# Patient Record
Sex: FEMALE | Race: WHITE | NOT HISPANIC OR LATINO | URBAN - METROPOLITAN AREA
[De-identification: names, ages, dates, MRNs, and addresses within clinical notes are randomized per-mention and may not be internally consistent; named-entity substitution may affect disease eponyms.]

---

## 2017-01-23 ENCOUNTER — AMBULATORY - HEALTHEAST (OUTPATIENT)
Dept: NEUROSURGERY | Facility: CLINIC | Age: 67
End: 2017-01-23

## 2017-01-24 ENCOUNTER — AMBULATORY - HEALTHEAST (OUTPATIENT)
Dept: LAB | Facility: CLINIC | Age: 67
End: 2017-01-24

## 2017-01-24 ENCOUNTER — HOSPITAL ENCOUNTER (OUTPATIENT)
Dept: RADIOLOGY | Facility: CLINIC | Age: 67
Discharge: HOME | End: 2017-01-24
Attending: SURGERY

## 2017-01-24 DIAGNOSIS — Z41.9 SURGERY, ELECTIVE: ICD-10-CM

## 2017-01-26 ENCOUNTER — COMMUNICATION - HEALTHEAST (OUTPATIENT)
Dept: NEUROSURGERY | Facility: CLINIC | Age: 67
End: 2017-01-26

## 2017-01-26 ENCOUNTER — RECORDS - HEALTHEAST (OUTPATIENT)
Dept: ADMINISTRATIVE | Facility: OTHER | Age: 67
End: 2017-01-26

## 2017-01-26 ENCOUNTER — OFFICE VISIT - HEALTHEAST (OUTPATIENT)
Dept: NEUROSURGERY | Facility: CLINIC | Age: 67
End: 2017-01-26

## 2017-01-26 DIAGNOSIS — Z01.818 PRE-OP EVALUATION: ICD-10-CM

## 2017-01-31 ENCOUNTER — RECORDS - HEALTHEAST (OUTPATIENT)
Dept: ADMINISTRATIVE | Facility: OTHER | Age: 67
End: 2017-01-31

## 2017-01-31 ENCOUNTER — ANESTHESIA - HEALTHEAST (OUTPATIENT)
Dept: SURGERY | Facility: CLINIC | Age: 67
End: 2017-01-31

## 2017-02-14 ENCOUNTER — ANESTHESIA - HEALTHEAST (OUTPATIENT)
Dept: SURGERY | Facility: CLINIC | Age: 67
End: 2017-02-14

## 2017-02-15 ENCOUNTER — HOSPITAL ENCOUNTER (INPATIENT)
Dept: MEDSURG UNIT | Facility: CLINIC | Age: 67
Discharge: HOME | End: 2017-02-18
Attending: SURGERY | Admitting: SURGERY
Payer: COMMERCIAL

## 2017-02-15 ENCOUNTER — SURGERY - HEALTHEAST (OUTPATIENT)
Dept: SURGERY | Facility: CLINIC | Age: 67
End: 2017-02-15

## 2017-02-15 DIAGNOSIS — K59.03 CONSTIPATION DUE TO PAIN MEDICATION: ICD-10-CM

## 2017-02-15 DIAGNOSIS — G89.18 POSTOPERATIVE PAIN: ICD-10-CM

## 2017-02-15 DIAGNOSIS — Z00.00 PREVENTATIVE HEALTH CARE: ICD-10-CM

## 2017-02-15 RX ORDER — IBUPROFEN 200 MG
1 CAPSULE ORAL 2 TIMES DAILY
Status: SHIPPED | COMMUNITY
Start: 2017-02-15

## 2017-02-15 RX ORDER — ASCORBIC ACID 500 MG
500 TABLET ORAL DAILY
Status: SHIPPED | COMMUNITY
Start: 2017-02-15

## 2017-02-15 ASSESSMENT — MIFFLIN-ST. JEOR
SCORE: 935.64
SCORE: 935.64

## 2017-02-16 LAB
CREAT SERPL-MCNC: 0.76 MG/DL (ref 0.6–1.1)
GFR SERPL CREATININE-BSD FRML MDRD: >60 ML/MIN/1.73M2

## 2017-02-16 ASSESSMENT — MIFFLIN-ST. JEOR
SCORE: 947.26
SCORE: 947.26

## 2017-02-18 RX ORDER — ASPIRIN 81 MG/1
81 TABLET, CHEWABLE ORAL DAILY
Refills: 0 | Status: SHIPPED | COMMUNITY
Start: 2017-02-22

## 2017-02-19 ENCOUNTER — COMMUNICATION - HEALTHEAST (OUTPATIENT)
Dept: SCHEDULING | Facility: CLINIC | Age: 67
End: 2017-02-19

## 2017-02-20 ENCOUNTER — COMMUNICATION - HEALTHEAST (OUTPATIENT)
Dept: NEUROSURGERY | Facility: CLINIC | Age: 67
End: 2017-02-20

## 2017-02-27 ENCOUNTER — AMBULATORY - HEALTHEAST (OUTPATIENT)
Dept: NEUROSURGERY | Facility: CLINIC | Age: 67
End: 2017-02-27

## 2017-02-27 DIAGNOSIS — Z51.89 VISIT FOR WOUND CHECK: ICD-10-CM

## 2017-03-06 ENCOUNTER — AMBULATORY - HEALTHEAST (OUTPATIENT)
Dept: NEUROSURGERY | Facility: CLINIC | Age: 67
End: 2017-03-06

## 2017-03-06 DIAGNOSIS — Z48.02 REMOVAL OF STAPLES: ICD-10-CM

## 2017-03-29 ENCOUNTER — OFFICE VISIT - HEALTHEAST (OUTPATIENT)
Dept: NEUROSURGERY | Facility: CLINIC | Age: 67
End: 2017-03-29

## 2017-03-29 ENCOUNTER — AMBULATORY - HEALTHEAST (OUTPATIENT)
Dept: NEUROSURGERY | Facility: CLINIC | Age: 67
End: 2017-03-29

## 2017-03-29 DIAGNOSIS — M48.062 LUMBAR STENOSIS WITH NEUROGENIC CLAUDICATION: ICD-10-CM

## 2017-03-29 RX ORDER — ACETAMINOPHEN 500 MG
500 TABLET ORAL EVERY 6 HOURS PRN
Status: SHIPPED | COMMUNITY
Start: 2017-03-29

## 2017-03-29 RX ORDER — IBUPROFEN 200 MG
200 TABLET ORAL EVERY 6 HOURS PRN
Status: SHIPPED | COMMUNITY
Start: 2017-03-29

## 2017-03-29 ASSESSMENT — MIFFLIN-ST. JEOR: SCORE: 933.66

## 2017-03-30 ENCOUNTER — OFFICE VISIT - HEALTHEAST (OUTPATIENT)
Dept: PHYSICAL THERAPY | Facility: REHABILITATION | Age: 67
End: 2017-03-30

## 2017-03-30 DIAGNOSIS — R29.898 HIP WEAKNESS: ICD-10-CM

## 2017-03-30 DIAGNOSIS — M48.062 LUMBAR STENOSIS WITH NEUROGENIC CLAUDICATION: ICD-10-CM

## 2017-03-30 DIAGNOSIS — R29.898 WEAKNESS OF LEFT HIP: ICD-10-CM

## 2017-03-30 DIAGNOSIS — M53.86 DECREASED ROM OF LUMBAR SPINE: ICD-10-CM

## 2017-03-30 DIAGNOSIS — M54.16 LUMBAR RADICULOPATHY: ICD-10-CM

## 2017-04-03 ENCOUNTER — OFFICE VISIT - HEALTHEAST (OUTPATIENT)
Dept: PHYSICAL THERAPY | Facility: REHABILITATION | Age: 67
End: 2017-04-03

## 2017-04-03 DIAGNOSIS — M53.86 DECREASED ROM OF LUMBAR SPINE: ICD-10-CM

## 2017-04-03 DIAGNOSIS — M54.16 LUMBAR RADICULOPATHY: ICD-10-CM

## 2017-04-03 DIAGNOSIS — R29.898 WEAKNESS OF BOTH HIPS: ICD-10-CM

## 2017-04-03 DIAGNOSIS — M48.062 LUMBAR STENOSIS WITH NEUROGENIC CLAUDICATION: ICD-10-CM

## 2017-04-06 ENCOUNTER — OFFICE VISIT - HEALTHEAST (OUTPATIENT)
Dept: PHYSICAL THERAPY | Facility: REHABILITATION | Age: 67
End: 2017-04-06

## 2017-04-06 DIAGNOSIS — M48.062 LUMBAR STENOSIS WITH NEUROGENIC CLAUDICATION: ICD-10-CM

## 2017-04-06 DIAGNOSIS — R29.898 WEAKNESS OF LEFT HIP: ICD-10-CM

## 2017-04-06 DIAGNOSIS — M53.86 DECREASED ROM OF LUMBAR SPINE: ICD-10-CM

## 2017-04-06 DIAGNOSIS — R29.898 WEAKNESS OF BOTH HIPS: ICD-10-CM

## 2017-04-06 DIAGNOSIS — M54.16 LUMBAR RADICULOPATHY: ICD-10-CM

## 2017-04-10 ENCOUNTER — OFFICE VISIT - HEALTHEAST (OUTPATIENT)
Dept: PHYSICAL THERAPY | Facility: REHABILITATION | Age: 67
End: 2017-04-10

## 2017-04-10 DIAGNOSIS — M53.86 DECREASED ROM OF LUMBAR SPINE: ICD-10-CM

## 2017-04-10 DIAGNOSIS — M48.062 LUMBAR STENOSIS WITH NEUROGENIC CLAUDICATION: ICD-10-CM

## 2017-04-10 DIAGNOSIS — R29.898 WEAKNESS OF BOTH HIPS: ICD-10-CM

## 2017-04-10 DIAGNOSIS — R29.898 WEAKNESS OF LEFT HIP: ICD-10-CM

## 2017-04-10 DIAGNOSIS — M54.16 LUMBAR RADICULOPATHY: ICD-10-CM

## 2017-04-13 ENCOUNTER — OFFICE VISIT - HEALTHEAST (OUTPATIENT)
Dept: PHYSICAL THERAPY | Facility: REHABILITATION | Age: 67
End: 2017-04-13

## 2017-04-13 DIAGNOSIS — M48.062 LUMBAR STENOSIS WITH NEUROGENIC CLAUDICATION: ICD-10-CM

## 2017-04-13 DIAGNOSIS — M54.16 LUMBAR RADICULOPATHY: ICD-10-CM

## 2017-04-13 DIAGNOSIS — R29.898 WEAKNESS OF BOTH HIPS: ICD-10-CM

## 2017-04-13 DIAGNOSIS — M53.86 DECREASED ROM OF LUMBAR SPINE: ICD-10-CM

## 2017-04-13 DIAGNOSIS — R29.898 WEAKNESS OF LEFT HIP: ICD-10-CM

## 2017-04-20 ENCOUNTER — OFFICE VISIT - HEALTHEAST (OUTPATIENT)
Dept: PHYSICAL THERAPY | Facility: REHABILITATION | Age: 67
End: 2017-04-20

## 2017-04-20 DIAGNOSIS — M54.16 LUMBAR RADICULOPATHY: ICD-10-CM

## 2017-04-20 DIAGNOSIS — R29.898 WEAKNESS OF BOTH HIPS: ICD-10-CM

## 2017-04-20 DIAGNOSIS — M53.86 DECREASED ROM OF LUMBAR SPINE: ICD-10-CM

## 2017-04-20 DIAGNOSIS — M48.062 LUMBAR STENOSIS WITH NEUROGENIC CLAUDICATION: ICD-10-CM

## 2017-04-24 ENCOUNTER — OFFICE VISIT - HEALTHEAST (OUTPATIENT)
Dept: PHYSICAL THERAPY | Facility: REHABILITATION | Age: 67
End: 2017-04-24

## 2017-04-24 DIAGNOSIS — M54.16 LUMBAR RADICULOPATHY: ICD-10-CM

## 2017-04-24 DIAGNOSIS — R29.898 WEAKNESS OF BOTH HIPS: ICD-10-CM

## 2017-04-24 DIAGNOSIS — R29.898 WEAKNESS OF LEFT HIP: ICD-10-CM

## 2017-04-24 DIAGNOSIS — M48.062 LUMBAR STENOSIS WITH NEUROGENIC CLAUDICATION: ICD-10-CM

## 2017-04-24 DIAGNOSIS — M53.86 DECREASED ROM OF LUMBAR SPINE: ICD-10-CM

## 2017-04-27 ENCOUNTER — OFFICE VISIT - HEALTHEAST (OUTPATIENT)
Dept: PHYSICAL THERAPY | Facility: REHABILITATION | Age: 67
End: 2017-04-27

## 2017-04-27 DIAGNOSIS — M48.062 LUMBAR STENOSIS WITH NEUROGENIC CLAUDICATION: ICD-10-CM

## 2017-04-27 DIAGNOSIS — M54.16 LUMBAR RADICULOPATHY: ICD-10-CM

## 2017-04-27 DIAGNOSIS — R29.898 WEAKNESS OF BOTH HIPS: ICD-10-CM

## 2017-04-27 DIAGNOSIS — M53.86 DECREASED ROM OF LUMBAR SPINE: ICD-10-CM

## 2017-04-27 DIAGNOSIS — R29.898 WEAKNESS OF LEFT HIP: ICD-10-CM

## 2017-05-02 ENCOUNTER — OFFICE VISIT - HEALTHEAST (OUTPATIENT)
Dept: PHYSICAL THERAPY | Facility: REHABILITATION | Age: 67
End: 2017-05-02

## 2017-05-02 DIAGNOSIS — M48.062 LUMBAR STENOSIS WITH NEUROGENIC CLAUDICATION: ICD-10-CM

## 2017-05-02 DIAGNOSIS — R29.898 WEAKNESS OF BOTH HIPS: ICD-10-CM

## 2017-05-02 DIAGNOSIS — M54.16 LUMBAR RADICULOPATHY: ICD-10-CM

## 2017-05-02 DIAGNOSIS — R29.898 WEAKNESS OF LEFT HIP: ICD-10-CM

## 2017-05-02 DIAGNOSIS — M53.86 DECREASED ROM OF LUMBAR SPINE: ICD-10-CM

## 2017-05-04 ENCOUNTER — OFFICE VISIT - HEALTHEAST (OUTPATIENT)
Dept: PHYSICAL THERAPY | Facility: REHABILITATION | Age: 67
End: 2017-05-04

## 2017-05-04 DIAGNOSIS — M54.16 LUMBAR RADICULOPATHY: ICD-10-CM

## 2017-05-04 DIAGNOSIS — M48.062 LUMBAR STENOSIS WITH NEUROGENIC CLAUDICATION: ICD-10-CM

## 2017-05-04 DIAGNOSIS — M53.86 DECREASED ROM OF LUMBAR SPINE: ICD-10-CM

## 2017-05-04 DIAGNOSIS — R29.898 WEAKNESS OF BOTH HIPS: ICD-10-CM

## 2017-05-09 ENCOUNTER — OFFICE VISIT - HEALTHEAST (OUTPATIENT)
Dept: PHYSICAL THERAPY | Facility: REHABILITATION | Age: 67
End: 2017-05-09

## 2017-05-09 DIAGNOSIS — M48.062 LUMBAR STENOSIS WITH NEUROGENIC CLAUDICATION: ICD-10-CM

## 2017-05-09 DIAGNOSIS — M54.16 LUMBAR RADICULOPATHY: ICD-10-CM

## 2017-05-09 DIAGNOSIS — R29.898 WEAKNESS OF BOTH HIPS: ICD-10-CM

## 2017-05-09 DIAGNOSIS — R29.898 WEAKNESS OF LEFT HIP: ICD-10-CM

## 2017-05-09 DIAGNOSIS — M53.86 DECREASED ROM OF LUMBAR SPINE: ICD-10-CM

## 2017-05-11 ENCOUNTER — OFFICE VISIT - HEALTHEAST (OUTPATIENT)
Dept: PHYSICAL THERAPY | Facility: REHABILITATION | Age: 67
End: 2017-05-11

## 2017-05-11 DIAGNOSIS — R29.898 WEAKNESS OF BOTH HIPS: ICD-10-CM

## 2017-05-11 DIAGNOSIS — M54.16 LUMBAR RADICULOPATHY: ICD-10-CM

## 2017-05-11 DIAGNOSIS — M48.062 LUMBAR STENOSIS WITH NEUROGENIC CLAUDICATION: ICD-10-CM

## 2017-05-11 DIAGNOSIS — M53.86 DECREASED ROM OF LUMBAR SPINE: ICD-10-CM

## 2017-05-16 ENCOUNTER — OFFICE VISIT - HEALTHEAST (OUTPATIENT)
Dept: PHYSICAL THERAPY | Facility: REHABILITATION | Age: 67
End: 2017-05-16

## 2017-05-16 DIAGNOSIS — M54.16 LUMBAR RADICULOPATHY: ICD-10-CM

## 2017-05-16 DIAGNOSIS — R29.898 WEAKNESS OF BOTH HIPS: ICD-10-CM

## 2017-05-16 DIAGNOSIS — M53.86 DECREASED ROM OF LUMBAR SPINE: ICD-10-CM

## 2017-05-16 DIAGNOSIS — R29.898 WEAKNESS OF LEFT HIP: ICD-10-CM

## 2017-05-16 DIAGNOSIS — M48.062 LUMBAR STENOSIS WITH NEUROGENIC CLAUDICATION: ICD-10-CM

## 2017-05-18 ENCOUNTER — OFFICE VISIT - HEALTHEAST (OUTPATIENT)
Dept: PHYSICAL THERAPY | Facility: REHABILITATION | Age: 67
End: 2017-05-18

## 2017-05-18 DIAGNOSIS — M48.062 LUMBAR STENOSIS WITH NEUROGENIC CLAUDICATION: ICD-10-CM

## 2017-05-18 DIAGNOSIS — R29.898 WEAKNESS OF LEFT HIP: ICD-10-CM

## 2017-05-18 DIAGNOSIS — M53.86 DECREASED ROM OF LUMBAR SPINE: ICD-10-CM

## 2017-05-18 DIAGNOSIS — M54.16 LUMBAR RADICULOPATHY: ICD-10-CM

## 2017-05-18 DIAGNOSIS — R29.898 WEAKNESS OF BOTH HIPS: ICD-10-CM

## 2017-05-23 ENCOUNTER — OFFICE VISIT - HEALTHEAST (OUTPATIENT)
Dept: PHYSICAL THERAPY | Facility: REHABILITATION | Age: 67
End: 2017-05-23

## 2017-05-23 DIAGNOSIS — M48.062 LUMBAR STENOSIS WITH NEUROGENIC CLAUDICATION: ICD-10-CM

## 2017-05-23 DIAGNOSIS — R29.898 WEAKNESS OF BOTH HIPS: ICD-10-CM

## 2017-05-23 DIAGNOSIS — R29.898 WEAKNESS OF LEFT HIP: ICD-10-CM

## 2017-05-23 DIAGNOSIS — M53.86 DECREASED ROM OF LUMBAR SPINE: ICD-10-CM

## 2017-05-23 DIAGNOSIS — M54.16 LUMBAR RADICULOPATHY: ICD-10-CM

## 2017-05-25 ENCOUNTER — OFFICE VISIT - HEALTHEAST (OUTPATIENT)
Dept: PHYSICAL THERAPY | Facility: REHABILITATION | Age: 67
End: 2017-05-25

## 2017-05-25 DIAGNOSIS — M54.16 LUMBAR RADICULOPATHY: ICD-10-CM

## 2017-05-25 DIAGNOSIS — M48.062 LUMBAR STENOSIS WITH NEUROGENIC CLAUDICATION: ICD-10-CM

## 2017-05-25 DIAGNOSIS — M53.86 DECREASED ROM OF LUMBAR SPINE: ICD-10-CM

## 2017-05-25 DIAGNOSIS — R29.898 WEAKNESS OF BOTH HIPS: ICD-10-CM

## 2017-05-30 ENCOUNTER — OFFICE VISIT - HEALTHEAST (OUTPATIENT)
Dept: PHYSICAL THERAPY | Facility: REHABILITATION | Age: 67
End: 2017-05-30

## 2017-05-30 DIAGNOSIS — M48.062 LUMBAR STENOSIS WITH NEUROGENIC CLAUDICATION: ICD-10-CM

## 2017-05-30 DIAGNOSIS — R29.898 WEAKNESS OF LEFT HIP: ICD-10-CM

## 2017-05-30 DIAGNOSIS — M53.86 DECREASED ROM OF LUMBAR SPINE: ICD-10-CM

## 2017-05-30 DIAGNOSIS — R29.898 WEAKNESS OF BOTH HIPS: ICD-10-CM

## 2017-05-30 DIAGNOSIS — M54.16 LUMBAR RADICULOPATHY: ICD-10-CM

## 2017-06-01 ENCOUNTER — OFFICE VISIT - HEALTHEAST (OUTPATIENT)
Dept: PHYSICAL THERAPY | Facility: REHABILITATION | Age: 67
End: 2017-06-01

## 2017-06-01 DIAGNOSIS — R29.898 WEAKNESS OF BOTH HIPS: ICD-10-CM

## 2017-06-01 DIAGNOSIS — M54.16 LUMBAR RADICULOPATHY: ICD-10-CM

## 2017-06-01 DIAGNOSIS — R29.898 WEAKNESS OF LEFT HIP: ICD-10-CM

## 2017-06-01 DIAGNOSIS — M48.062 LUMBAR STENOSIS WITH NEUROGENIC CLAUDICATION: ICD-10-CM

## 2017-06-01 DIAGNOSIS — M53.86 DECREASED ROM OF LUMBAR SPINE: ICD-10-CM

## 2017-06-06 ENCOUNTER — RECORDS - HEALTHEAST (OUTPATIENT)
Dept: ADMINISTRATIVE | Facility: OTHER | Age: 67
End: 2017-06-06

## 2017-06-06 ENCOUNTER — OFFICE VISIT - HEALTHEAST (OUTPATIENT)
Dept: PHYSICAL THERAPY | Facility: REHABILITATION | Age: 67
End: 2017-06-06

## 2017-06-06 DIAGNOSIS — M53.86 DECREASED ROM OF LUMBAR SPINE: ICD-10-CM

## 2017-06-06 DIAGNOSIS — R29.898 WEAKNESS OF LEFT HIP: ICD-10-CM

## 2017-06-06 DIAGNOSIS — R29.898 WEAKNESS OF BOTH HIPS: ICD-10-CM

## 2017-06-06 DIAGNOSIS — M54.16 LUMBAR RADICULOPATHY: ICD-10-CM

## 2017-06-06 DIAGNOSIS — M48.062 LUMBAR STENOSIS WITH NEUROGENIC CLAUDICATION: ICD-10-CM

## 2017-06-08 ENCOUNTER — OFFICE VISIT - HEALTHEAST (OUTPATIENT)
Dept: PHYSICAL THERAPY | Facility: REHABILITATION | Age: 67
End: 2017-06-08

## 2017-06-08 DIAGNOSIS — M53.86 DECREASED ROM OF LUMBAR SPINE: ICD-10-CM

## 2017-06-08 DIAGNOSIS — R29.898 WEAKNESS OF LEFT HIP: ICD-10-CM

## 2017-06-08 DIAGNOSIS — R29.898 WEAKNESS OF BOTH HIPS: ICD-10-CM

## 2017-06-08 DIAGNOSIS — M48.062 LUMBAR STENOSIS WITH NEUROGENIC CLAUDICATION: ICD-10-CM

## 2017-06-08 DIAGNOSIS — M54.16 LUMBAR RADICULOPATHY: ICD-10-CM

## 2017-06-27 ENCOUNTER — OFFICE VISIT - HEALTHEAST (OUTPATIENT)
Dept: PHYSICAL THERAPY | Facility: REHABILITATION | Age: 67
End: 2017-06-27

## 2017-06-27 DIAGNOSIS — M48.062 LUMBAR STENOSIS WITH NEUROGENIC CLAUDICATION: ICD-10-CM

## 2017-06-27 DIAGNOSIS — M53.86 DECREASED ROM OF LUMBAR SPINE: ICD-10-CM

## 2017-06-27 DIAGNOSIS — M54.16 LUMBAR RADICULOPATHY: ICD-10-CM

## 2017-06-27 DIAGNOSIS — R29.898 WEAKNESS OF BOTH HIPS: ICD-10-CM

## 2017-06-27 DIAGNOSIS — J45.909 ASTHMA: ICD-10-CM

## 2017-06-27 DIAGNOSIS — R29.898 WEAKNESS OF LEFT HIP: ICD-10-CM

## 2017-06-29 ENCOUNTER — OFFICE VISIT - HEALTHEAST (OUTPATIENT)
Dept: PHYSICAL THERAPY | Facility: REHABILITATION | Age: 67
End: 2017-06-29

## 2017-06-29 DIAGNOSIS — R29.898 WEAKNESS OF BOTH HIPS: ICD-10-CM

## 2017-06-29 DIAGNOSIS — M48.062 LUMBAR STENOSIS WITH NEUROGENIC CLAUDICATION: ICD-10-CM

## 2017-06-29 DIAGNOSIS — M54.16 LUMBAR RADICULOPATHY: ICD-10-CM

## 2017-06-29 DIAGNOSIS — M53.86 DECREASED ROM OF LUMBAR SPINE: ICD-10-CM

## 2017-06-29 DIAGNOSIS — R29.898 WEAKNESS OF LEFT HIP: ICD-10-CM

## 2017-07-20 ENCOUNTER — OFFICE VISIT - HEALTHEAST (OUTPATIENT)
Dept: PHYSICAL THERAPY | Facility: REHABILITATION | Age: 67
End: 2017-07-20

## 2017-07-20 DIAGNOSIS — M53.86 DECREASED ROM OF LUMBAR SPINE: ICD-10-CM

## 2017-07-20 DIAGNOSIS — M48.062 LUMBAR STENOSIS WITH NEUROGENIC CLAUDICATION: ICD-10-CM

## 2017-07-20 DIAGNOSIS — R29.898 WEAKNESS OF BOTH HIPS: ICD-10-CM

## 2017-07-20 DIAGNOSIS — M54.16 LUMBAR RADICULOPATHY: ICD-10-CM

## 2017-07-27 ENCOUNTER — OFFICE VISIT - HEALTHEAST (OUTPATIENT)
Dept: PHYSICAL THERAPY | Facility: REHABILITATION | Age: 67
End: 2017-07-27

## 2017-07-27 DIAGNOSIS — M48.062 LUMBAR STENOSIS WITH NEUROGENIC CLAUDICATION: ICD-10-CM

## 2017-07-27 DIAGNOSIS — R29.898 WEAKNESS OF BOTH HIPS: ICD-10-CM

## 2017-07-27 DIAGNOSIS — M54.16 LUMBAR RADICULOPATHY: ICD-10-CM

## 2017-07-27 DIAGNOSIS — M53.86 DECREASED ROM OF LUMBAR SPINE: ICD-10-CM

## 2017-08-03 ENCOUNTER — OFFICE VISIT - HEALTHEAST (OUTPATIENT)
Dept: PHYSICAL THERAPY | Facility: REHABILITATION | Age: 67
End: 2017-08-03

## 2017-08-03 DIAGNOSIS — R29.898 WEAKNESS OF BOTH HIPS: ICD-10-CM

## 2017-08-03 DIAGNOSIS — M48.062 LUMBAR STENOSIS WITH NEUROGENIC CLAUDICATION: ICD-10-CM

## 2017-08-03 DIAGNOSIS — M53.86 DECREASED ROM OF LUMBAR SPINE: ICD-10-CM

## 2017-08-03 DIAGNOSIS — M54.16 LUMBAR RADICULOPATHY: ICD-10-CM

## 2017-08-17 ENCOUNTER — OFFICE VISIT - HEALTHEAST (OUTPATIENT)
Dept: PHYSICAL THERAPY | Facility: REHABILITATION | Age: 67
End: 2017-08-17

## 2017-08-17 DIAGNOSIS — R29.898 WEAKNESS OF BOTH HIPS: ICD-10-CM

## 2017-08-17 DIAGNOSIS — M48.062 LUMBAR STENOSIS WITH NEUROGENIC CLAUDICATION: ICD-10-CM

## 2017-08-17 DIAGNOSIS — M53.86 DECREASED ROM OF LUMBAR SPINE: ICD-10-CM

## 2017-08-17 DIAGNOSIS — M54.16 LUMBAR RADICULOPATHY: ICD-10-CM

## 2017-11-30 ENCOUNTER — RECORDS - HEALTHEAST (OUTPATIENT)
Dept: LAB | Facility: CLINIC | Age: 67
End: 2017-11-30

## 2017-11-30 LAB
CHOLEST SERPL-MCNC: 222 MG/DL
FASTING STATUS PATIENT QL REPORTED: YES
HDLC SERPL-MCNC: 60 MG/DL
LDLC SERPL CALC-MCNC: 135 MG/DL
TRIGL SERPL-MCNC: 135 MG/DL

## 2017-12-01 LAB — HCV AB SERPL QL IA: NEGATIVE

## 2018-04-23 ENCOUNTER — RECORDS - HEALTHEAST (OUTPATIENT)
Dept: LAB | Facility: CLINIC | Age: 68
End: 2018-04-23

## 2018-04-23 LAB
ALBUMIN SERPL-MCNC: 3.8 G/DL (ref 3.5–5)
ALP SERPL-CCNC: 94 U/L (ref 45–120)
ALT SERPL W P-5'-P-CCNC: 15 U/L (ref 0–45)
ANION GAP SERPL CALCULATED.3IONS-SCNC: 13 MMOL/L (ref 5–18)
AST SERPL W P-5'-P-CCNC: 19 U/L (ref 0–40)
BILIRUB SERPL-MCNC: 0.6 MG/DL (ref 0–1)
BUN SERPL-MCNC: 20 MG/DL (ref 8–22)
CALCIUM SERPL-MCNC: 9.4 MG/DL (ref 8.5–10.5)
CHLORIDE BLD-SCNC: 106 MMOL/L (ref 98–107)
CO2 SERPL-SCNC: 23 MMOL/L (ref 22–31)
CREAT SERPL-MCNC: 0.71 MG/DL (ref 0.6–1.1)
GFR SERPL CREATININE-BSD FRML MDRD: >60 ML/MIN/1.73M2
GLUCOSE BLD-MCNC: 111 MG/DL (ref 70–125)
LIPASE SERPL-CCNC: 57 U/L (ref 0–52)
POTASSIUM BLD-SCNC: 3.5 MMOL/L (ref 3.5–5)
PROT SERPL-MCNC: 6.9 G/DL (ref 6–8)
SODIUM SERPL-SCNC: 142 MMOL/L (ref 136–145)

## 2020-05-07 ENCOUNTER — RECORDS - HEALTHEAST (OUTPATIENT)
Dept: LAB | Facility: CLINIC | Age: 70
End: 2020-05-07

## 2020-05-07 LAB
ALBUMIN SERPL-MCNC: 3.9 G/DL (ref 3.5–5)
ALP SERPL-CCNC: 81 U/L (ref 45–120)
ALT SERPL W P-5'-P-CCNC: 16 U/L (ref 0–45)
ANION GAP SERPL CALCULATED.3IONS-SCNC: 8 MMOL/L (ref 5–18)
AST SERPL W P-5'-P-CCNC: 24 U/L (ref 0–40)
BILIRUB SERPL-MCNC: 0.5 MG/DL (ref 0–1)
BUN SERPL-MCNC: 19 MG/DL (ref 8–22)
CALCIUM SERPL-MCNC: 9.4 MG/DL (ref 8.5–10.5)
CHLORIDE BLD-SCNC: 102 MMOL/L (ref 98–107)
CO2 SERPL-SCNC: 28 MMOL/L (ref 22–31)
CREAT SERPL-MCNC: 0.76 MG/DL (ref 0.6–1.1)
GFR SERPL CREATININE-BSD FRML MDRD: >60 ML/MIN/1.73M2
GLUCOSE BLD-MCNC: 81 MG/DL (ref 70–125)
POTASSIUM BLD-SCNC: 3.9 MMOL/L (ref 3.5–5)
PROT SERPL-MCNC: 6.7 G/DL (ref 6–8)
SODIUM SERPL-SCNC: 138 MMOL/L (ref 136–145)

## 2021-04-26 ENCOUNTER — RECORDS - HEALTHEAST (OUTPATIENT)
Dept: LAB | Facility: CLINIC | Age: 71
End: 2021-04-26

## 2021-04-26 LAB
ALBUMIN SERPL-MCNC: 3.8 G/DL (ref 3.5–5)
ALP SERPL-CCNC: 82 U/L (ref 45–120)
ALT SERPL W P-5'-P-CCNC: 10 U/L (ref 0–45)
ANION GAP SERPL CALCULATED.3IONS-SCNC: 12 MMOL/L (ref 5–18)
AST SERPL W P-5'-P-CCNC: 17 U/L (ref 0–40)
BILIRUB SERPL-MCNC: 0.2 MG/DL (ref 0–1)
BUN SERPL-MCNC: 23 MG/DL (ref 8–28)
CALCIUM SERPL-MCNC: 9.1 MG/DL (ref 8.5–10.5)
CHLORIDE BLD-SCNC: 106 MMOL/L (ref 98–107)
CO2 SERPL-SCNC: 25 MMOL/L (ref 22–31)
CREAT SERPL-MCNC: 0.71 MG/DL (ref 0.6–1.1)
GFR SERPL CREATININE-BSD FRML MDRD: >60 ML/MIN/1.73M2
GLUCOSE BLD-MCNC: 95 MG/DL (ref 70–125)
POTASSIUM BLD-SCNC: 4.1 MMOL/L (ref 3.5–5)
PROT SERPL-MCNC: 6.4 G/DL (ref 6–8)
SODIUM SERPL-SCNC: 143 MMOL/L (ref 136–145)
TSH SERPL DL<=0.005 MIU/L-ACNC: 1.82 UIU/ML (ref 0.3–5)

## 2021-04-27 LAB — 25(OH)D3 SERPL-MCNC: 55.8 NG/ML (ref 30–80)

## 2021-04-29 ENCOUNTER — AMBULATORY - HEALTHEAST (OUTPATIENT)
Dept: PHARMACY | Facility: CLINIC | Age: 71
End: 2021-04-29

## 2021-04-29 DIAGNOSIS — M81.0 SENILE OSTEOPOROSIS: ICD-10-CM

## 2021-04-30 ENCOUNTER — COMMUNICATION - HEALTHEAST (OUTPATIENT)
Dept: ADMINISTRATIVE | Facility: HOSPITAL | Age: 71
End: 2021-04-30

## 2021-05-12 ENCOUNTER — AMBULATORY - HEALTHEAST (OUTPATIENT)
Dept: PHARMACY | Facility: HOSPITAL | Age: 71
End: 2021-05-12

## 2021-05-26 ENCOUNTER — RECORDS - HEALTHEAST (OUTPATIENT)
Dept: ADMINISTRATIVE | Facility: CLINIC | Age: 71
End: 2021-05-26

## 2021-05-29 ENCOUNTER — RECORDS - HEALTHEAST (OUTPATIENT)
Dept: ADMINISTRATIVE | Facility: CLINIC | Age: 71
End: 2021-05-29

## 2021-05-30 ENCOUNTER — RECORDS - HEALTHEAST (OUTPATIENT)
Dept: ADMINISTRATIVE | Facility: CLINIC | Age: 71
End: 2021-05-30

## 2021-05-30 VITALS — BODY MASS INDEX: 19.83 KG/M2 | WEIGHT: 105 LBS | HEIGHT: 61 IN

## 2021-05-30 VITALS
BODY MASS INDEX: 20.39 KG/M2 | HEIGHT: 61 IN | WEIGHT: 108 LBS | WEIGHT: 108 LBS | HEIGHT: 61 IN | BODY MASS INDEX: 20.39 KG/M2

## 2021-05-30 VITALS — HEIGHT: 61 IN | BODY MASS INDEX: 20.18 KG/M2

## 2021-05-30 VITALS — BODY MASS INDEX: 19.78 KG/M2 | BODY MASS INDEX: 20.51 KG/M2 | HEIGHT: 61 IN | WEIGHT: 106.8 LBS

## 2021-06-02 ENCOUNTER — RECORDS - HEALTHEAST (OUTPATIENT)
Dept: ADMINISTRATIVE | Facility: CLINIC | Age: 71
End: 2021-06-02

## 2021-06-08 NOTE — DISCHARGE SUMMARY
HOSPITAL DISCHARGE SUMMARY         Patient Name: Oma Gross  YOB: 1950  Age: 66 y.o.  Medical Record Number: [id number]  Primary Physician: Juan Frazier MD  Admission Date: 2/15/2017.  Discharge Date: February 18, 2017       She will be discharged from [facility] to home    PRINCIPAL DIAGNOSIS CAUSING ADMISSION: Stenosis, spinal, lumbar    Discharge Diagnoses:  Principal Problem:    Stenosis, spinal, lumbar  Active Problems:    Cauda equina compression      Other CO-MORBID Hospital conditions:    BRIEF HOSPITAL COURSE: Oma Gross is a 66 y.o. female who was admitted on day of surgery and underwent DECOMPRESSIVE LUMBAR LAMINECTOMY L2-3-4-5 BILATERAL START ON LEFT SIDE on 2/15/2017 by Dr. Inderjit Pollard.  Surgery was without complications.  Postoperatively she reported worsening lower extremity weakness, MRI was done and negative. Weakness improved.   On exam on day of discharge, her strength was full with no new focal deficits.  PT/OT consultations were obtained to assess function, assist with mobilization, and evaluate for discharge recommendations.  By POD # 3 she was tolerating a regular diet, ambulating, voiding without difficulty, and obtaining good pain control on oral medication. She had a bowel movement in the hospital. Her incision was clean, dry and intact.   She met all discharge criteria and was stable for discharge.      PROCEDURES PERFORMED DURING HOSPITALIZATION: DECOMPRESSIVE LUMBAR LAMINECTOMY L2-3-4-5 BILATERAL START ON LEFT SIDE     COMPLICATIONS IN HOSPITAL:  None.    IMPORTANT PENDING TEST RESULTS: None.    RECENT LABS:   Lab Results   Component Value Date     01/24/2017    K 4.1 01/24/2017     01/24/2017    CO2 28 01/24/2017    BUN 21 01/24/2017    CREATININE 0.76 02/16/2017    CALCIUM 9.3 01/24/2017     Lab Results   Component Value Date    WBC 6.3 01/24/2017    HGB 12.6 01/24/2017    HCT 37.5 01/24/2017    MCV 90 01/24/2017      01/24/2017       PERTINENT FINDINGS/RESULTS AT DISCHARGE: lumbar spinal stenosis with compression of the cauda equina    CONDITION AT DISCHARGE:  improving    DISCHARGE MEDICATIONS     Medication List      START taking these medications          acetaminophen-codeine 300-30 mg per tablet   Quantity:  50 tablet   Dose:  1-2 tablet   Commonly known as:  TYLENOL #3   1-2 tablets, Oral, Q4H PRN       cyclobenzaprine 5 MG tablet   Quantity:  30 tablet   Dose:  5 mg   Commonly known as:  FLEXERIL   5 mg, Oral, TID PRN       polyethylene glycol 17 gram packet   Quantity:  14 each   Dose:  17 g   Commonly known as:  MIRALAX   17 g, Oral, BID       senna-docusate 8.6-50 mg tablet   Dose:  1 tablet   Commonly known as:  PERICOLACE   1 tablet, Oral, BID         CHANGE how you take these medications          aspirin 81 mg chewable tablet   Dose:  81 mg   Start taking on:  2/22/2017   81 mg, Oral, DAILY, May resume 2/22/2017   What changed:  additional instructions         CONTINUE taking these medications          ascorbic acid (vitamin C) 500 MG tablet   Dose:  500 mg   Commonly known as:  VITAMIN C   500 mg, Oral, DAILY       calcium citrate 200 mg (950 mg) tablet   Dose:  1 tablet   Commonly known as:  CALCITRATE   1 tablet, Oral, BID       docusate sodium 100 MG capsule   Dose:  200 mg   Commonly known as:  COLACE   200 mg, Oral, Daily PRN       multivitamin therapeutic tablet   Dose:  1 tablet   Commonly known as:  THERAGRAN   1 tablet, Oral, DAILY       ranitidine 300 MG tablet   Dose:  300 mg   Commonly known as:  ZANTAC   300 mg, Oral, QHS       ROLAIDS EXTRA STRENGTH ORAL   Dose:  1 tablet   1 tablet, Oral, QID PRN         STOP taking these medications          acetaminophen 500 MG tablet   Commonly known as:  TYLENOL               AFTER DISCHARGE ORDERS AND INSTRUCTIONS:    Follow up with Neurosurgery in 1  weeks for wound check, 6 weeks for postop visit.  Follow up appointment with Primary Care Physician: Juan WELDON  MD Karin in  as needed/ as directed.    Diet: as prior to surgery  Activity: see below  Restrictions: No lifting > 10 pounds. No bending, twisting, or over head lifting.   Wound care: Keep the wound clean and dry.  Cover with a waterproof dressing for showers.  Staples out with surgeon's nurse in 21 days after surgery.      Chey Unger [facility]  [address] [place] [address], [address]  [place] [address]    O: [phone]  P: [phone]

## 2021-06-08 NOTE — ANESTHESIA CARE TRANSFER NOTE
Last vitals:   Vitals:    02/15/17 1257   BP: 107/50   Pulse: 68   Resp: 12   Temp: 36.8  C (98.3  F)   SpO2: 100%     Patient's level of consciousness is drowsy  Spontaneous respirations: yes  Maintains airway independently: yes  Dentition unchanged: yes  Oropharynx: oropharynx clear of all foreign objects    QCDR Measures:  ASA# 20 - Surgical Safety Checklist: ASA20A - Safety Checks Done  PQRS# 430 - Adult PONV Prevention: 4558F - Pt received => 2 anti-emetic agents (different classes) preop & intraop  ASA# 8 - Peds PONV Prevention: NA - Not pediatric patient, not GA or 2 or more risk factors NOT present  PQRS# 424 - Arianna-op Temp Management: 4559F - At least one body temp DOCUMENTED => 35.5C or 95.9F within required timeframe  PQRS# 426 - PACU Transfer Protocol: - Transfer of care checklist used  ASA# 14 - Acute Post-op Pain: ASA14B - Patient did NOT experience pain >= 7 out of 10    I completed my SBAR handoff to the receiving nurse per policy and procedure.

## 2021-06-08 NOTE — OP NOTE
Operative Note    Name:  Oma Gross  Location: [facility] Main OR  Procedure Date:  2/15/2017  PCP:  Juan Frazier MD      DECOMPRESSIVE LUMBAR LAMINECTOMY L2-3-4-5 BILATERAL START ON LEFT SIDE (Bilateral)    Pre-Procedure Diagnosis:  Stenosis, spinal, lumbar [M48.06]     Post-Procedure Diagnosis:    * No post-op diagnosis entered *    Surgeon(s):  Inderjit Pollard MD    No Physician Assistant or First Assist has been documented in procedure    Anesthesia Type:  General    Findings:      Operative Report:    Prior to surgery I discussed with the patient the nature of the problem, the nature of the proposed surgery, the rationale for doing it, the goals, benefits, alternatives, and significant risks and complications.  I answered all her questions.  She said she was satisfied with the explanation and understood.  She requested surgery.  She gave informed consent to go ahead with surgery.  The operation was performed under general endotracheal anesthesia.  The patient had usual preop precautions like preoperative antibiotics, Gramajo catheter, pneumoboots, and so forth.   She was log rolled from supine to prone.  Her body lay on a padded frame.  Pressure points were padded.  The operative site in the lumbar region was shaved, prepped, and draped in the usual manner.  Spinal needles were placed, and a lateral x-ray was taken for localization.  The pedicles of L 2 and 3 and 4 and 5 were marked on the drape.  A vertical midline incision was made centered on those structures.  The incision was carried down through subcutaneous fat down to the spinous processes.  Paravertebral muscle was taken down off the spinous processes and laminae bilaterally with Valleylab cautery and a Murphy elevator.  Angled cerebellar retractors were used to maintain exposure.  An operating microscope was used for micro-dissection using microsurgical techniques.  A metal marker was placed at the caudal margin of the L2 pedicle and an  x-ray was taken for localization.  The x-ray was discussed with a radiologist.  We both agreed on L2.  The spinous process of L2 was removed.  The stump was waxed.  A midline laminectomy was done at L 2 proceeding from caudal to cephalad.  Bone was thinned with a Midas AM8 drill and removed with pneumatic Kerrisons.  At the cephalad end the laminectomy defect was carried from medial to lateral by thinning the bone with the Midas drill and removing the thin shell of remaining bone with pneumatic Kerrisons.  This took us out to the pedicle of L 2 on the left.  A similar procedure was done at L 3 to expose the pedicle of L 3 on the left.  The pars interarticularis and inferior articular process of L 2 were cleaned off with the TappIn cautery on a low setting.  A channel was then drilled from the medial aspect of the pedicle of L 2 to the medial aspect of the pedicle of L 3 preserving a good healthy strut of pars interarticularis and inferior articular process.  Bone medial to the channel was removed by thinning it with the Midas drill and removing it with Kerrison rongeurs.  A foraminotomy was then done over the L 2 nerve root by working from the pedicle of L 2 on down and from the pedicle of L 3 on up with a combination of Link rongeurs and angled curettes of increasing size.  In this way a good foraminotomy was done using an undercutting technique that preserved the pars interarticularis and inferior articular process and therefore the facet joint.  Similar laminectomies and foraminotomies were done at L 3-4 and L4-5 on the left.  The microscope and drill and cautery were then moved to the opposite side of the table and similar laminectomies and foraminotomies were done at L 2-3 and L3-4 and L4-5 on the right.  Hemostasis was obtained with a combination of bone wax and bipolar cautery on a low setting and epidural Gelfoam soaked in thrombin.  The wound was copiously irrigated.  A 10 mm flat Cornel-Freire drain was  left in the epidural space and brought out through a separate stab incision and secured with 2-0 silk.  The retractors were removed.  The muscle fascia was closed with 0 Vicryl.  Subcutaneous fat was closed with 0 Vicryl.  Skin was closed with staples.  A sterile dressing was applied.  The patient was log rolled from prone back to supine.  As far as I can tell she tolerated the procedure well.  As far as I can tell no complications were encountered.    Estimated Blood Loss:   225 mL from 2/15/2017  7:36 AM to 2/15/2017 12:54 PM    Specimens:    * No specimens in log *       Drains:   Closed/Suction Drain Posterior;Right Back Bulb 10 Fr. (Active)   Site Description Unable to view 2/15/2017  2:22 PM   Dressing Status  Clean;Dry;Intact 2/15/2017  2:22 PM   Drainage Appearance Bloody 2/15/2017  2:22 PM   Status To bulb suction 2/15/2017  2:22 PM   Output (mL) 40 mL 2/15/2017  2:22 PM       Urethral Catheter Non-latex 16 Fr. (Active)   Site Skin Assessment Clean;Intact 2/15/2017  1:30 PM   Care/Interventions Patent and draining;Tubing is not taut/pulling on catheter;Drainage bag kept below bladder;No tubing or bag contact with floor;Urinary drainage bag is kept < half full;Closed drainage system maintained 2/15/2017  1:30 PM   Securement Method Stabilization device 2/15/2017  1:30 PM   Protocol 1: Patient excluded from protocol? Yes, Specific provider order to keep catheter in place 2/15/2017  1:30 PM   Output (mL) 125 mL 2/15/2017  2:22 PM   Drainage Color Gaby 2/15/2017  2:22 PM       Implants:  * No implants in log *    Complications:    None    Inderjit Pollard     Date: 2/15/2017  Time: 4:45 PM

## 2021-06-08 NOTE — PROGRESS NOTES
Preop Assessment: Oma Gross presents for pre-op review.  Surgeon: Dr. Inderjit Pollard  Name of Surgery: bilateral lumbar decompressive laminectomy L2-3-4  Diagnosis: lumbar stenosis  Date of Surgery: 1/31/2017  Time of Surgery: 0730  Hospital: [facility]  H&P: by Dr. Frazier on 1/23/2017 - cleared for surgery  History of ASA, NSAIDS, vitamin and/or herbal supplements within 10 days: Yes - stopped ASA  History of blood thinners: No  History of anti-seizure med's: No  Review of systems: unchanged    Diagnostics:  Labs: WNL  CXR: clear chest  EKG: stable, ECHO on 11/3/2016)  Other:   Films: MRI from 4/13/2016 at [facility] - in PACS    All questions answered regarding surgery and expected pre and postoperative course including rehabilitation phase.     Reviewed with patient: Arrive 2.5 -3 hours prior to scheduled surgery, nothing to eat or drink after midnight the night before surgery and bring all pertinent films to the hospital the day of surgery.  Continue to refrain from NSAIDS (Ibuprofen, Aleve, Naprosyn) ASA or over the counter herbal medication or supplements, anticoagulants and blood thinners.    Preop skin preparations and instructions provided.      Olivia Beth RN, CNRN

## 2021-06-08 NOTE — PROGRESS NOTES
Pharmacy Note - Brief Admission Medication History Note  Pharmacist completed medication history with the patient while in the [facility].  Prior to admission (PTA) med list completed and updated in the electronic medical record (EMR).    The patient was asked about OTC/herbal products specifically and the PTA med list reflects the patient's response.    Allergies were reviewed and assessed with the patient, responses were updated in the EMR.    Thank you for the opportunity to participate in the care of this patient.    Grover Sharp, PharmD     2/15/2017     6:16 AM    PTA Med List   Medication Sig Last Dose     acetaminophen (TYLENOL) 500 MG tablet Take 1,000 mg by mouth 2 (two) times a day.  2/15/2017 at am     ascorbic acid, vitamin C, (VITAMIN C) 500 MG tablet Take 500 mg by mouth daily. 2/14/2017     aspirin 81 mg chewable tablet Chew 81 mg daily. 1/20/2017     CALCIUM CARB/MAGNESIUM HYDROX (ROLAIDS EXTRA STRENGTH ORAL) Take 1 tablet by mouth 4 (four) times a day as needed (heartburn).  2/14/2017     calcium citrate (CALCITRATE) 200 mg (950 mg) tablet Take 1 tablet by mouth 2 (two) times a day. 2/14/2017     docusate sodium (COLACE) 100 MG capsule Take 200 mg by mouth daily as needed for constipation. Past Week     multivitamin therapeutic (THERAGRAN) tablet Take 1 tablet by mouth daily. 2/14/2017     ranitidine (ZANTAC) 300 MG tablet Take 300 mg by mouth bedtime. 2/14/2017

## 2021-06-08 NOTE — ANESTHESIA PREPROCEDURE EVALUATION
Anesthesia Evaluation      Patient summary reviewed   No history of anesthetic complications     Airway   Mallampati: I  Neck ROM: limited   Pulmonary - normal exam   (+) asthma  mild,  well controlled,                          Cardiovascular - negative ROS and normal exam  Exercise tolerance: > or = 4 METS   Neuro/Psych    (+) CVA (TIA) , chronic pain    Endo/Other    (+) arthritis,      GI/Hepatic/Renal    (+) GERD poorly controlled,             Dental - normal exam   (+) bridge                       Anesthesia Plan  Planned anesthetic: general endotracheal  50 mg ketamine and 10 mg methadone IV on induction.  ASA 2   Induction: intravenous   Anesthetic plan and risks discussed with: patient  Anesthesia plan special considerations: rapid sequence induction, antiemetics,   Post-op plan: routine recovery

## 2021-06-08 NOTE — BRIEF OP NOTE
Brief Operative Note    Name:  Oma Gross  Location: [facility] Main OR  Procedure Date:  2/15/2017  PCP:  Juan Frazier MD      DECOMPRESSIVE LUMBAR LAMINECTOMY L2-3-4-5 BILATERAL START ON LEFT SIDE (Bilateral) plus scope    Pre-Procedure Diagnosis:    Stenosis, spinal, lumbar [M48.06]     Post-Procedure Diagnosis:    * No post-op diagnosis entered *    Surgeon(s):  Inderjit Pollard MD    Findings:    Lumbar spinal stenosis, cauda equina compression, probe at caudal margin of L2 pedicle    Estimated Blood Loss:   225 mL from 2/15/2017  7:36 AM to 2/15/2017 12:40 PM    Specimens:    * No specimens in log *       Drains:   Closed/Suction Drain Posterior;Right Back Bulb 10 Fr. (Active)       Urethral Catheter Non-latex 16 Fr. (Active)       Implants:  * No implants in log *    Complications:    None    Inderjit Pollard     Date: 2/15/2017  Time: 12:40 PM

## 2021-06-08 NOTE — PROGRESS NOTES
NEUROSURGERY POST-OP NOTE:    ASSESSMENT:     Oma Gross is POD # 1 status post  DECOMPRESSIVE LUMBAR LAMINECTOMY L2-3-4-5 BILATERAL START ON LEFT SIDE  by Dr. Pollard. Today she reports nausea, stomach ache and bilateral thighs pain (more when sitting).  Reports bilateral lower extremity pain and weakness are worse than prior to surgery. Unable to walk without significant assistance because of weakness, knee buckling, and pain (more pain in her bilateral thighs). Pain better after tylenol #3 and lying down, but still weak to walk (unable to stand straight). Reports no numbness and tingling in her bilateral lower extremity. Voided 250ml and bladder scanned for 192ml. Has Hx of chronic constipation.           PLAN:   Patient Active Problem List   Diagnosis     Stenosis, spinal, lumbar     Cauda equina compression  - Neuro exam Q4hrs and as needed if neuro exam changes.  - Advance diet as tolerated.  - Continue with current oral pain medications, Tylenol #3   - Ordered MRI to rule out postop cord compression--result showed no new acute cord compression, see detail result below.             Bowel and Bladder Plan:     Post Void Residuals: Yes; 192ml      Bowel Management:  Prophylaxis yes   Last BM: 02/13/17   Medications Plan:     Anticoagulation/Antiplatelets: will hold her Aspirin 81mg for five days postop.      DVT Prophylaxis: intermittent pneumatic compression boots     GI Prophylaxis: yes     Other:        Other Plans:   Drain: (1)Cornel-Freire drain(s) with closed bulb suction in the lower back in place with bloody drainage 50 ml of output last shift.       Incision: Incision was approximated with staple and covered with dry and 4 x 4 gauze The incision is clean, dry and no drainage     Activity Plan:  Up to chair for all her meals. Ambulate in the hallway X3 beyond her therapy and encourage IS use Q1-2 hrs 10-20 times while awake            Discharge Recommendations/Plan:  Tomorrow   Barriers to  "Discharge:  Epidural drain required to mitigate neurological risk of epidural hematoma causing cord/canal compression.  Requires ongoing nursing monitoring  for assessment of profound bleeding, cerebrospinal fluid leak or drain malfunction with resulting risk of epidural infection and abscess.   This will necessitate ongoing hospitalization until epidural drain removed.      Follow Up Plan: in one week for wound check, in three weeks for staples removal, and in six weeks for postop evaluation          HPI: Oma Gross presented to neurosurgery clinic with back pain, and bilateral leg pain, worse on the right than left. Reports right calf was feeling like constricting and right foot tingling. She was unable to walk steady and was holding things because of weakness and unsteady balance. She was presented opposite of typical pseudo-claudication as her leg symptoms get worse when she sits and worse when she stops walking. walking helps with her leg symptoms. Typical of pseudo-claudication she gets worse when she tries to extend her back. She gets better when she flexes forward. MRI showed Lumbar spinal stenosis L2-3 and L3-4 and lateral recess stenosis at L4-5       SUBJECTIVE:  She is sitting on chair and eating her breakfast and stated \"I am having pain in my both thighs and became worse since i am sitting\"       OBJECTIVE:  Vital signs in last 24 hours  Temp:  [98  F (36.7  C)-98.5  F (36.9  C)] 98  F (36.7  C)  Heart Rate:  [59-98] 62  Resp:  [8-23] 20  BP: ()/(46-64) 100/47  Body mass index is 20.41 kg/(m^2).    Mental Status: alert and oriented, person, place and timespeech normal tone   Cranial Nerves: CN1: grossly intact per patient recall. CN2: No funduscopic exam performed. CN3,4 & 6: Pupillary light response, lateral and vertical gaze normal. No nystagmus. Visual fields are full to confrontation. CN5: Intact to touch CN7: No facial weakness, smile, facial symmetry intact. CN8: Intact to spoken " voice. CN9&10: Gag reflex, uvula midline, palate rises with phonation. CN11: Shoulder shrug 5/5 intact bilaterally. CN12: Tongue midline and moves freely from side to side.    Motor Strength:normal 5/5 strength in all tested muscle groups    Hip flexors 5/5 right, 5/5 left   Quadriceps: 4/5 right, 4/5 left   Ankle dorsiflexion: 4/5 right, 4/5 left   Extensor hallicus longus: 4/5 right, 4/5 left   Ankle plantar flexion : 4/5 right, 4/5 left     Bilateral knee and ankle reflexes are 2+    Pertinent Labs   Lab Results:   Lab Results   Component Value Date     01/24/2017    K 4.1 01/24/2017     01/24/2017    CO2 28 01/24/2017    BUN 21 01/24/2017    CREATININE 0.76 01/24/2017    CALCIUM 9.3 01/24/2017     [facility]  MR LUMBAR SPINE W WO CONTRAST  2/16/2017 2:14 PM      INDICATION: New BLE pain and weakness. Previous lumbar decompression.  TECHNIQUE: Routine.  CONTRAST: 9 ml Magnevist  COMPARISON: MRI lumbar spine 04/13/2016     FINDINGS: Transitional lumbosacral anatomy with partial sacralization of L5. Nomenclature is consistent with that used previously. Unchanged spinal alignment including mild levocurvature centered at L4, 2 mm anterolisthesis at L2-L3, 3 mm anterolisthesis at L4-L5, and minimal anterolisthesis at L5-S1. Unchanged vertebral body heights. Mild Modic type I endplate edema and some reactive enhancement at L3-L4 and L4-L5 similar to the preoperative exam. Changes of interval decompressive laminectomy from L2 through L5. No pars defect. The conus tip is identified at mid L1. No pathologic intradural enhancement. Expected postoperative changes within posterior paraspinal soft tissues. A surgical drain is seen in the laminectomy bed. No abdominal aortic aneurysm. The visualized portions of the bony pelvis are normal for age.     T12-L1: Preserved disc height and signal. No focal disc herniation. No facet arthropathy. No spinal canal stenosis. No neural foraminal stenosis.       L1-L2: Minimal disc desiccation preserved disc height. Mild circumferential disc bulge. Mild facet arthropathy. No spinal canal stenosis. No neural foraminal stenosis.     L2-L3: Mild to moderate loss of disc height and signal. Circumferential disc bulge with small central disc protrusion. Minor interbody spurring. Moderate facet arthropathy. Interval dorsal decompression with improved patency of the spinal canal. No spinal canal stenosis. Mild right and mild to moderate left neural foraminal stenosis.     L3-L4: Advanced loss of disc height and signal. Mild circumferential disc osteophyte complex of asymmetric left foraminal and lateral component. Moderate facet arthropathy. Dorsal decompression. Improved patency of the spinal canal. No spinal canal stenosis. Mild to moderate right and moderate left neural foraminal stenosis. neural foraminal stenosis.     L4-L5: Moderate to advanced loss of disc height and signal. Circumferential disc bulge with superimposed shallow central disc protrusion. Mild interbody spurring. Advanced right and moderate left facet arthropathy. Dorsal decompression. No spinal canal stenosis. Moderate to severe right and mild left neural foraminal stenosis.     L5-S1: Transitional level with presumed somewhat hypoplastic disc space. No focal disc herniation. No facet arthropathy. No spinal canal stenosis. No neural foraminal stenosis.     IMPRESSION:   CONCLUSION:  1. Changes of recent dorsal decompression from L2 through L5 with improved patency of the spinal canal across this segment. Expected postoperative changes within the posterior paraspinal soft tissues.  2. At L4-L5, moderate to severe right and mild left neural foraminal stenosis is slightly increased compared to the preoperative exam.  3. At L3-L4, mild to moderate right and moderate left neural foraminal stenosis.  4. At L2-L3, mild right and mild to moderate left neural foraminal stenosis.      ADDITIONAL COMMENTS:The  patient's clinical examination, laboratory data, and plan was discussed with Dr. Atul Dill, Replaced by [facility]  [address], [address]  [place] [phone]

## 2021-06-08 NOTE — ANESTHESIA POSTPROCEDURE EVALUATION
Patient: Oma Gross  DECOMPRESSIVE LUMBAR LAMINECTOMY L2-3-4-5 BILATERAL START ON LEFT SIDE  Anesthesia type: general    Patient location: PACU  Last vitals:   Vitals:    02/15/17 1519   BP: 95/54   Pulse: 72   Resp: 16   Temp: 36.8  C (98.3  F)   SpO2: 99%     Post vital signs: stable  Level of consciousness: awake and responds to simple questions  Post-anesthesia pain: pain controlled  Post-anesthesia nausea and vomiting: no  Pulmonary: unassisted, nasal cannula  Cardiovascular: stable and blood pressure at baseline  Hydration: adequate  Anesthetic events: no    QCDR Measures:  ASA# 11 - Arianna-op Cardiac Arrest: ASA11B - Patient did NOT experience unanticipated cardiac arrest  ASA# 12 - Arianna-op Mortality Rate: ASA12B - Patient did NOT die  ASA# 13 - PACU Re-Intubation Rate: ASA13B - Patient did NOT require a new airway mgmt  ASA# 10 - Composite Anes Safety: ASA10A - No serious adverse event  ASA# 38 - New Corneal Injury: ASA38A - No new exposure keratitis or corneal abrasion in PACU    Additional Notes:

## 2021-06-08 NOTE — PROGRESS NOTES
Spiritual Care / Hospice Narrative Note    Referral Information:      Referral Reason: (P) Spiritual Support    Referral Source: (P) [facility]    Referral Comments:  Kelly Torres from [facility] [place] called here Tuesday afternoon to see if a  could anoint Zuleika before her surgery Wed. morning. Their  is in [place].    Spiritual Care Provided:        Sacraments Performed: (S) (P) Anointing ([facility] (?))         Visit Length: (P) 10-20 minutes      Describe additional spiritual care offered in visit in response to concerns and resources (Utilize phrases or bullet points, describe 's response, e.g., listen, validate, explore, educate, pray, etc.).  I worked with Kelly on Tuesday to find an available , and Kelly said that Zuleika's  ended up driving her that evening to a  to get anointed at [place] (?).     Summarize Plan of Care: (Timing of next visit, follow-up or handing over, subject(s) to be addressed, other staff or community caregiver communication, etc.)   available for spiritual care or emotional support as needed.     Eboni Restrepo,

## 2021-06-08 NOTE — PROGRESS NOTES
NEUROSURGERY POST-OP NOTE:    Patient seen and examined. I agree with this note from Jose Angel Andrade CNP.    ASSESSMENT:     Oma Gross is POD # 2 status post  DECOMPRESSIVE LUMBAR LAMINECTOMY L2-3-4-5 BILATERAL START ON LEFT SIDE  by Dr. Pollard. Today, she reports the leg pain has improved. Leg weakness has improved, she is ambulating with the walker. Complaining of mild back, hip pain. Complaining of bilateral thigh aching. Overall, improved since yesterday. MRI showed post-op changes.     PLAN:   Patient Active Problem List   Diagnosis     Stenosis, spinal, lumbar     Cauda equina compression  - Neuro exam Q4hrs and as needed if neuro exam changes.  - Pain control with Tylenol #3, ice as needed.   - Drain out when output <30cc.         Bowel and Bladder Plan:     Post Void Residuals: Yes; 34cc, 58cc, 192cc.      Bowel Management:  Prophylaxis yes   Last BM: 02/13/17. Increase bowel medications and offer suppository again. She refused earlier.    Medications Plan:     Anticoagulation/Antiplatelets: will hold her Aspirin 81mg for five days postop.      DVT Prophylaxis: intermittent pneumatic compression boots     GI Prophylaxis: yes        Drain: (1)Cornel-Freire drain(s) with closed bulb suction in the in epidural space with bloody drainage 30 ml of output last shift.       Incision: Incision was approximated with staples and covered with  primapore and 4 x 4 gauze The incision is clean, dry and no drainage.     Activity Plan:  Up to chair for all her meals. Ambulate in the hallway X3 beyond her therapy and encourage IS use Q1-2 hrs 10-20 times while awake            Discharge Recommendations/Plan:  Later today or tomorrow pending drain output.  Barriers to Discharge: Epidural drain required to mitigate neurological risk of epidural hematoma causing cord/canal compression.  Requires ongoing nursing monitoring  for assessment of profound bleeding, cerebrospinal fluid leak or drain malfunction with resulting risk  of epidural infection and abscess.   This will necessitate ongoing hospitalization until epidural drain removed.     Follow Up Plan: in one week for wound check, in three weeks for staples removal, and in six weeks for postop evaluation          HPI: Oma Gross presented to neurosurgery clinic with back pain, and bilateral leg pain, worse on the right than left. Reports right calf felt like it was constricting and right foot tingling. She was unable to walk steadily and was holding things because of weakness and unsteady balance. She was presented opposite of typical pseudo-claudication as her leg symptoms get worse when she sits and when she stops walking. walking helps with her leg symptoms. Typical of pseudo-claudication she gets worse when she tries to extend her back. She gets better when she flexes forward. MRI showed Lumbar spinal stenosis L2-3 and L3-4 and lateral recess stenosis at L4-5       SUBJECTIVE:  She is sitting up, pain is much improved since yesterday, denies weakness.    OBJECTIVE:  Vital signs in last 24 hours  Temp:  [98  F (36.7  C)-98.8  F (37.1  C)] 98.5  F (36.9  C)  Heart Rate:  [65-79] 79  Resp:  [19-20] 20  BP: ()/(43-56) 112/56  Body mass index is 20.41 kg/(m^2).    Mental Status: alert and oriented, person, place and time. speech normal tone   Cranial Nerves: PERRL, EOMI, face symmetric.   Motor Strength:  Hip flexors 5/5 right, 5/5 left   Quadriceps: 5/5 right, 5/5 left   Ankle dorsiflexion: 5/5 right, 5/5 left   Extensor hallicus longus: 5/5 right, 5/5 left   Ankle plantar flexion : 5/5 right, 5/5 left     Pertinent Labs   Lab Results:   Lab Results   Component Value Date     01/24/2017    K 4.1 01/24/2017     01/24/2017    CO2 28 01/24/2017    BUN 21 01/24/2017    CREATININE 0.76 02/16/2017    CALCIUM 9.3 01/24/2017     [facility]  MR LUMBAR SPINE W WO CONTRAST  2/16/2017 2:14 PM      INDICATION: New BLE pain and weakness. Previous lumbar  decompression.  TECHNIQUE: Routine.  CONTRAST: 9 ml Magnevist  COMPARISON: MRI lumbar spine 04/13/2016     FINDINGS: Transitional lumbosacral anatomy with partial sacralization of L5. Nomenclature is consistent with that used previously. Unchanged spinal alignment including mild levocurvature centered at L4, 2 mm anterolisthesis at L2-L3, 3 mm anterolisthesis at L4-L5, and minimal anterolisthesis at L5-S1. Unchanged vertebral body heights. Mild Modic type I endplate edema and some reactive enhancement at L3-L4 and L4-L5 similar to the preoperative exam. Changes of interval decompressive laminectomy from L2 through L5. No pars defect. The conus tip is identified at mid L1. No pathologic intradural enhancement. Expected postoperative changes within posterior paraspinal soft tissues. A surgical drain is seen in the laminectomy bed. No abdominal aortic aneurysm. The visualized portions of the bony pelvis are normal for age.     T12-L1: Preserved disc height and signal. No focal disc herniation. No facet arthropathy. No spinal canal stenosis. No neural foraminal stenosis.      L1-L2: Minimal disc desiccation preserved disc height. Mild circumferential disc bulge. Mild facet arthropathy. No spinal canal stenosis. No neural foraminal stenosis.     L2-L3: Mild to moderate loss of disc height and signal. Circumferential disc bulge with small central disc protrusion. Minor interbody spurring. Moderate facet arthropathy. Interval dorsal decompression with improved patency of the spinal canal. No spinal canal stenosis. Mild right and mild to moderate left neural foraminal stenosis.     L3-L4: Advanced loss of disc height and signal. Mild circumferential disc osteophyte complex of asymmetric left foraminal and lateral component. Moderate facet arthropathy. Dorsal decompression. Improved patency of the spinal canal. No spinal canal stenosis. Mild to moderate right and moderate left neural foraminal stenosis. neural foraminal  stenosis.     L4-L5: Moderate to advanced loss of disc height and signal. Circumferential disc bulge with superimposed shallow central disc protrusion. Mild interbody spurring. Advanced right and moderate left facet arthropathy. Dorsal decompression. No spinal canal stenosis. Moderate to severe right and mild left neural foraminal stenosis.     L5-S1: Transitional level with presumed somewhat hypoplastic disc space. No focal disc herniation. No facet arthropathy. No spinal canal stenosis. No neural foraminal stenosis.     IMPRESSION:   CONCLUSION:  1. Changes of recent dorsal decompression from L2 through L5 with improved patency of the spinal canal across this segment. Expected postoperative changes within the posterior paraspinal soft tissues.  2. At L4-L5, moderate to severe right and mild left neural foraminal stenosis is slightly increased compared to the preoperative exam.  3. At L3-L4, mild to moderate right and moderate left neural foraminal stenosis.  4. At L2-L3, mild right and mild to moderate left neural foraminal stenosis.      ADDITIONAL COMMENTS:The patient's clinical examination, laboratory data, and plan was discussed with Dr. Atul Andrade, [facility]  [phone]

## 2021-06-08 NOTE — PROGRESS NOTES
NEUROSURGERY POST-OP NOTE:    ASSESSMENT:     Oma Gross is POD # 1 status post  DECOMPRESSIVE LUMBAR LAMINECTOMY L2-3-4-5 BILATERAL START ON LEFT SIDE  by Dr. Inderjit Pollard       No new or worsening symptoms-exam at baseline.  Residual bilateral thigh pain.   Cauda equina compression noted, which increases risk of issues w/ bowel and bladder   Reports feeling like she getting a migraine( describes typical migraines headache associated w nausea and blacking out) usually comes to ER for management.   Symptoms are not suggestive of low pressure headache.   Leave drain another day. Rox lombardo'd      PLAN:   Patient Active Problem List   Diagnosis     Stenosis, spinal, lumbar  H/O migraine-continue to track for worsening symptoms     Cauda equina compression    Post Void Residuals: In progress--Follow closely causa compression increases risk of residual     Bowel Management:  Prophylaxis yes   Last BM:Before discharge    DVT Prophylaxis: intermittent pneumatic compression boots    Drain: ETHEL in place with bloody drainage 90 ml of output last shift    Activity Plan: Therapies/Out for all meals/Ambulate at least TID beyond therapies       Discharge Recommendations/Plan:  Barriers to Discharge: yes  Epidural drain required to mitigate neurological risk of epidural hematoma causing cord/canal compression.  Requires ongoing nursing monitoring  for assessment of profound bleeding, cerebrospinal fluid leak or drain malfunction with resulting risk of epidural infection and abscess.   This will necessitate ongoing hospitalization until epidural drain removed.         Follow Up Plan:  Anticipate drain out tomorrow and home w family          HPI: Oma Gross presented with symptoms of neurogenic claudication w associated N&T, she had no documented weakness. On imaging she had multi-level lumbar stenosis     SUBJECTIVE:  I fell like I'm getting a migraine       OBJECTIVE:  Vital signs in last 24 hours  Temp:  [98  F (36.7   C)-98.5  F (36.9  C)] 98  F (36.7  C)  Heart Rate:  [59-98] 62  Resp:  [8-23] 20  BP: ()/(46-64) 100/47  Body mass index is 20.41 kg/(m^2).    Mental Status: alert, person, place and timespeech normal  Cranial Nerves: II to XII  unremarkable  Motor Strength:normal 5/5 strength in all tested muscle groups  Incision: Incision was approximated with staples and covered with 4 x 4 gauze The incision is clean and dry       Pertinent Labs   Lab Results:   No new     Pertinent Radiology   Radiology Results: .             ADDITIONAL COMMENTS:The patient's clinical examination, laboratory data, and plan was discussed with None

## 2021-06-09 NOTE — PROGRESS NOTES
Oma Gross is status post bilateral decompressive laminectomy L2-L5 on 2/15/2017 by Dr. Inderjit Pollard.  Last seen in clinic on 2/27/2017 for a wound check.  Today she returns in follow up for staples removal. She is doing reasonably well - her back pain is minimal, associated with activities. Denies radicular pain. Uses a walker due to slight residual weakness in her right leg. Denies sensory disturbance. Gait and balance are stable. Takes ES Tylenol prn.      Surgical wound WNL - CDI, no signs of infection or skin breakdown.  Incision well-healed: good skin approximation, no redness or visible/palpable edema, no tenderness to palpation.  PT. AF, denies fever, chills or sweats.  Pt. reports that the symptoms are improved from pre-op.    Staples - intact removed without difficulty. Wound prepped with Betadine before and after removal.  Surrounding skin has no signs of breakdown.  Verbal instructions regarding incision care are given.  Pt. advised to call us if any s/s of infection noted - all discussed in details.

## 2021-06-09 NOTE — PROGRESS NOTES
CHART NOTE     DATE OF SERVICE:  3/29/2017     : 1950     ASSESSMENT :   Doing well with improvement in symptoms and function.      PLAN:  Loosen restrictions. Refer to PT. RTC prn. Enc to call with any new questions or concerns.     HPI:  Oma Gross is status post bilateral decompressive laminectomy L2-L5 on 2/15/2017 by Dr. Inderjit Pollard.   Preoperatively presented with bilateral low back and legs pain and symptoms of neurogenic claudication.     TODAY, Oma Gross reports leg pain is about 60% improved. Walking much improved. Prolonged standing provokes back and leg pain.  Bladder/bowel without issue other than occasional constipation. Using walker.       PAST MEDICAL HISTORY, SURGICAL HISTORY, REVIEW OF SYMPTOMS, MEDICATIONS AND ALLERGIES:  Past medical history, surgical history, review of symptoms, medications and allergies remain unchanged.    Past Medical History:   Diagnosis Date     Asthma      Chronic neck pain      GERD (gastroesophageal reflux disease)      History of transfusion      IBS (irritable bowel syndrome)      Low back pain        PHYSICAL EXAM:      Neurological exam reveals:  Recent and remote memory intact, fund of knowledge wnl.    Alert and oriented x3, speech fluent and appropriate.   PERRL, EOMI, No nystagmus, Face symmetric, tongue midline, Shoulder shrug equal  Leg strength bilateral dorsiflexion, plantar flexion, and hip flexion 5/5  Can heel/toe walk with contact guard assist.  Toes stay up nicely, good rise up on toes.   Muscle Bulk and tone wnl.   Reflexes: No pathological reflexes   Gait and station:Antalgic but steady  Incision: CDI without erythema or edema  NDI/ALAN: 47% due to restrictions.      RADIOGRAPHIC IMAGING: NA

## 2021-06-09 NOTE — PROGRESS NOTES
Oma Gross is status post bilateral decompressive laminectomy L2-L5 on 2/15/2017 by Dr. Inderjit Pollard.  Preoperatively presented with bilateral low back and legs pain and symptoms of neurogenic claudication.  Today she is here for a wound check. She is doing reasonably well - her back feels stiff. She denies sensory or motor issues in LE, uses a walker for safety. Takes T#3 prn for bilateral buttocks ache. Gait and balance are stable.      Surgical wound WNL - CDI, no signs of infection or skin breakdown.  Incision well-healed: good skin approximation, no redness or visible/palpable edema, no tenderness to palpation.  PT. AF, denies fever, chills or sweats.  Pt. reports that the symptoms are improved from pre-op.

## 2021-06-12 NOTE — PROGRESS NOTES
[facility] Discharge Summary  Patient Name: Oma Gross  Date: 11/22/2017  Referral Diagnosis:    Referring provider: Shahana Gomez CNP  Visit Diagnosis:   1. Lumbar stenosis with neurogenic claudication     2. Lumbar radiculopathy     3. Decreased ROM of lumbar spine     4. Weakness of both hips         Goals:  No Data Recorded  No Data Recorded    Patient was seen for 24 visits physical therapy.    The patient She was referred back to her neurologist and did not return for further therapy.  Goals were not fully achieved. Explanation for goals not achieved: The patient discontinued therapy, did not return.    Therapy will be discontinued at this time.  Please see progress note dated 8/17/17 for patient status.      Thank you for your referral.  Víctor Mallory, PT  11/22/2017  10:30 AM         [facility]   [facility] Follow up    Patient Name: Oma Gross  Date of evaluation: 8/17/2017  Referral Diagnosis: neurogenic claudication and decompresion laminectomy   Referring provider: Shahana Gomez CNP  Visits: 24  Visit Diagnosis:     ICD-10-CM    1. Lumbar stenosis with neurogenic claudication M48.06    2. Lumbar radiculopathy M54.16    3. Decreased ROM of lumbar spine M25.60    4. Weakness of both hips M62.81        Assessment:       Progress is up and down.   She continues to have low back pain and bilateral LE symptoms.  She feels like her legs are heavy.  She sometimes doesn't clear her foot.  She has neck pain and a history of cervical issues.    I think it is possible that the current symptoms are being driven from the neck.  Recommend f/u with neurologist.      The POC is dynamic and will be modified on an ongoing basis.  Prognosis to achieve goals is  good   Pt. is appropriate for skilled PT intervention as outlined in the Plan of Care (POC).  Pt. is a good candidate for skilled PT services to improve pain levels and function.    Goals:  Pt. will  demonstrate/verbalize independence in self-management of condition in : 6 weeks Goal met  Pt. will have improved quality of sleep: waking less times/night;in 6 weeks Progressing  Pt. will be able to walk : 30 minutes;on even surfaces;with less pain;in 6 weeks Goal met  Patient will stand : 30 minutes;with less pain;for home chores;in 6 weeks Progressing  Patient will transfer: sit/stand;for in/out of chair;with less pain;in 6 weeks Goal Met  No Data Recorded       Plan / Patient Instructions:      Return to consult with Dr. Rose about lack of resolution of symptoms.     Subjective:     Low back pain 5-6/10  Patient very frustrated with progress, or lack of.    Functional limitations are described as occurring with.:    - walking >10 min   - standing >5 min   - in and out of a chair   - bending   - amb over uneven surfaces   - lifting   - dressing   - squatting   - housework       Objective:      Burning pain in low back with bridging on mat with head flat on table.  Symptoms increased with hook lying and patient began to get nauseous.    Putting patient's head on a pillow relieved the burning and nausea.          Treatment Today     TREATMENT MINUTES COMMENTS   Evaluation     Self-care/ Home management     Manual therapy     Neuromuscular Re-education     Therapeutic Activity     Therapeutic Exercises 30 See exercise flow-sheet for details.    Gait training     Modality__________________                Total 30    Blank areas are intentional and mean the treatment did not include these items.          Víctor Mallory  8/17/2017

## 2021-06-12 NOTE — PROGRESS NOTES
[facility]   [facility]-P[facility] Follow up    Patient Name: Oma Gross  Date of evaluation: 8/3/2017  Referral Diagnosis: neurogenic claudication and decompresion laminectomy   Referring provider: Shahana Gomez CNP  Visits: 23  Visit Diagnosis:     ICD-10-CM    1. Lumbar stenosis with neurogenic claudication M48.06    2. Weakness of both hips M62.81    3. Lumbar radiculopathy M54.16    4. Decreased ROM of lumbar spine M25.60        Assessment:       Patient's functional mobility and gait continue to improve despite patient's feeling like she is not making progress.  She tolerated new exercises very well.    Pt presents S/p L2-5 decompressive laminectomies and neurogenic claudication. Pt demonstrates significant difference in L to R hip strength with L demonstrating increased weakness compared to R in tandem stance. Pt would benefit from sklled physical therapy in order to address pt's deficits to decrease pain and radicular symptoms for walking and standing for extended durations without pain.   The POC is dynamic and will be modified on an ongoing basis.  Prognosis to achieve goals is  good   Pt. is appropriate for skilled PT intervention as outlined in the Plan of Care (POC).  Pt. is a good candidate for skilled PT services to improve pain levels and function.    Goals:  Pt. will demonstrate/verbalize independence in self-management of condition in : 6 weeks Goal met  Pt. will have improved quality of sleep: waking less times/night;in 6 weeks Progressing  Pt. will be able to walk : 30 minutes;on even surfaces;with less pain;in 6 weeks Goal met  Patient will stand : 30 minutes;with less pain;for home chores;in 6 weeks Progressing  Patient will transfer: sit/stand;for in/out of chair;with less pain;in 6 weeks Goal Met  No Data Recorded       Plan / Patient Instructions:      Continue with swiss ball routine.  Then follow up in 2-3 weeks.     Subjective:     Low back pain 5-6/10  Patient is again  frustrated with progress or lack of progress.    Functional limitations are described as occurring with.:    - walking >10 min   - standing >5 min   - in and out of a chair   - bending   - amb over uneven surfaces   - lifting   - dressing   - squatting   - housework       Objective:      Limited quads and hip flexors bilaterally.      Treatment Today     TREATMENT MINUTES COMMENTS   Evaluation     Self-care/ Home management     Manual therapy     Neuromuscular Re-education     Therapeutic Activity     Therapeutic Exercises 30 See exercise flow-sheet for details.    Gait training     Modality__________________                Total 30    Blank areas are intentional and mean the treatment did not include these items.          Víctor Mallory  8/3/2017

## 2021-06-12 NOTE — PROGRESS NOTES
[facility]   [facility]-Pelvic Follow up    Patient Name: Oma Gross  Date of evaluation: 7/27/2017  Referral Diagnosis: neurogenic claudication and decompresion laminectomy   Referring provider: Shahana Gomez CNP  Visits: 22  Visit Diagnosis:     ICD-10-CM    1. Lumbar stenosis with neurogenic claudication M48.06    2. Weakness of both hips M62.81    3. Lumbar radiculopathy M54.16    4. Decreased ROM of lumbar spine M25.60        Assessment:       Patient is making good progress now.  She is having much less pain and much better functional mobility and gait.  She tolerated new exercises very well.    Pt presents S/p L2-5 decompressive laminectomies and neurogenic claudication. Pt demonstrates significant difference in L to R hip strength with L demonstrating increased weakness compared to R in tandem stance. Pt would benefit from sklled physical therapy in order to address pt's deficits to decrease pain and radicular symptoms for walking and standing for extended durations without pain.   The POC is dynamic and will be modified on an ongoing basis.  Prognosis to achieve goals is  good   Pt. is appropriate for skilled PT intervention as outlined in the Plan of Care (POC).  Pt. is a good candidate for skilled PT services to improve pain levels and function.    Goals:  Pt. will demonstrate/verbalize independence in self-management of condition in : 6 weeks Goal met  Pt. will have improved quality of sleep: waking less times/night;in 6 weeks Progressing  Pt. will be able to walk : 30 minutes;on even surfaces;with less pain;in 6 weeks Goal met  Patient will stand : 30 minutes;with less pain;for home chores;in 6 weeks Progressing  Patient will transfer: sit/stand;for in/out of chair;with less pain;in 6 weeks Goal Met  No Data Recorded       Plan / Patient Instructions:      Continue with swiss ball routine.  Then follow up in 2-3 weeks.     Subjective:     Feeling better. No pain today until she put on  shoes and sat in her car.    Functional limitations are described as occurring with.:    - walking >10 min   - standing >5 min   - in and out of a chair   - bending   - amb over uneven surfaces   - lifting   - dressing   - squatting   - housework       Objective:      Limited quads and hip flexors bilaterally.      Treatment Today     TREATMENT MINUTES COMMENTS   Evaluation     Self-care/ Home management     Manual therapy     Neuromuscular Re-education     Therapeutic Activity     Therapeutic Exercises 30 See exercise flow-sheet for details.    Gait training     Modality__________________                Total 30    Blank areas are intentional and mean the treatment did not include these items.          Víctor Mallory  7/27/2017

## 2021-06-15 PROBLEM — M48.061 STENOSIS, SPINAL, LUMBAR: Status: ACTIVE | Noted: 2017-02-15

## 2021-06-15 PROBLEM — K59.00 CONSTIPATION: Status: ACTIVE | Noted: 2017-02-19

## 2021-06-16 PROBLEM — M81.0 SENILE OSTEOPOROSIS: Status: ACTIVE | Noted: 2021-04-29

## 2021-06-22 DIAGNOSIS — M81.0 SENILE OSTEOPOROSIS: Primary | ICD-10-CM

## 2021-06-22 RX ORDER — DIPHENHYDRAMINE HYDROCHLORIDE 50 MG/ML
50 INJECTION INTRAMUSCULAR; INTRAVENOUS
Status: CANCELLED
Start: 2021-07-12

## 2021-06-22 RX ORDER — NALOXONE HYDROCHLORIDE 0.4 MG/ML
0.2 INJECTION, SOLUTION INTRAMUSCULAR; INTRAVENOUS; SUBCUTANEOUS
Status: CANCELLED | OUTPATIENT
Start: 2021-07-12

## 2021-06-22 RX ORDER — HEPARIN SODIUM,PORCINE 10 UNIT/ML
5 VIAL (ML) INTRAVENOUS
Status: CANCELLED | OUTPATIENT
Start: 2021-07-12

## 2021-06-22 RX ORDER — MEPERIDINE HYDROCHLORIDE 25 MG/ML
25 INJECTION INTRAMUSCULAR; INTRAVENOUS; SUBCUTANEOUS EVERY 30 MIN PRN
Status: CANCELLED | OUTPATIENT
Start: 2021-07-12

## 2021-06-22 RX ORDER — HEPARIN SODIUM (PORCINE) LOCK FLUSH IV SOLN 100 UNIT/ML 100 UNIT/ML
5 SOLUTION INTRAVENOUS
Status: CANCELLED | OUTPATIENT
Start: 2021-07-12

## 2021-06-22 RX ORDER — METHYLPREDNISOLONE SODIUM SUCCINATE 125 MG/2ML
125 INJECTION, POWDER, LYOPHILIZED, FOR SOLUTION INTRAMUSCULAR; INTRAVENOUS
Status: CANCELLED
Start: 2021-07-12

## 2021-06-22 RX ORDER — EPINEPHRINE 1 MG/ML
0.3 INJECTION, SOLUTION, CONCENTRATE INTRAVENOUS EVERY 5 MIN PRN
Status: CANCELLED | OUTPATIENT
Start: 2021-07-12

## 2021-06-22 RX ORDER — ALBUTEROL SULFATE 90 UG/1
1-2 AEROSOL, METERED RESPIRATORY (INHALATION)
Status: CANCELLED
Start: 2021-07-12

## 2021-06-22 RX ORDER — ZOLEDRONIC ACID 5 MG/100ML
5 INJECTION, SOLUTION INTRAVENOUS ONCE
Status: CANCELLED
Start: 2021-07-12 | End: 2021-07-12

## 2021-06-22 RX ORDER — ALBUTEROL SULFATE 0.83 MG/ML
2.5 SOLUTION RESPIRATORY (INHALATION)
Status: CANCELLED | OUTPATIENT
Start: 2021-07-12

## 2021-06-25 NOTE — PROGRESS NOTES
Progress Notes by Hermila Delaney PT at 5/2/2017 12:30 PM     Author: Hermila Delaney PT Service: -- Author Type: Physical Therapist    Filed: 5/2/2017  1:32 PM Encounter Date: 5/2/2017 Status: Signed    : Hermila Delaney PT (Physical Therapist)       [facility]   [facility]-Pelvic Follow up    Patient Name: Oma Gross  Date of evaluation: 5/2/2017  Referral Diagnosis: neurogenic claudication and decompresion laminectomy   Referring provider: Shahana Gomez CNP  Visits: 8/12  Visit Diagnosis:     ICD-10-CM    1. Lumbar stenosis with neurogenic claudication M48.06    2. Lumbar radiculopathy M54.16    3. Weakness of both hips M62.81    4. Decreased ROM of lumbar spine M25.60    5. Weakness of left hip M62.81        Assessment:    Pt presents S/p L2-5 decompressive laminectomies and neurogenic claudication. Pt is making steady progress in gluteal and abdominal strengthening as noted in ability to transition from sitting to standing without the use of her hands this day from standard height chair. R side of abdominals is realtively weaker than L due to myofacial restrictions in mid back. Pt would benefit from sklled physical therapy in order to address pt's deficits to decrease pain and radicular symptoms for walking and standing for extended durations without pain.   The POC is dynamic and will be modified on an ongoing basis.  Prognosis to achieve goals is  good   Pt. is appropriate for skilled PT intervention as outlined in the Plan of Care (POC).  Pt. is a good candidate for skilled PT services to improve pain levels and function.    Goals:  Pt. will demonstrate/verbalize independence in self-management of condition in : 6 weeks  Pt. will have improved quality of sleep: waking less times/night;in 6 weeks  Pt. will be able to walk : 30 minutes;on even surfaces;with less pain;in 6 weeks  Patient will stand : 30 minutes;with less pain;for home chores;in 6 weeks  Patient will transfer:  sit/stand;for in/out of chair;with less pain;in 6 weeks  No Data Recorded    Patient's expectations/goals are realistic.    Barriers to Learning or Achieving Goals:  Chronicity of the problem.  Co-morbidities or other medical factors.  See below       Plan / Patient Instructions:      Plan for next visit: NuStep, follow nerve pathyways for MFR, NOT PRONE, Cervical MFR     Subjective:   I can do 5-7 STS before pain. L hand gets tingles when I elevate the L hand.     Functional limitations are described as occurring with:    - walking >10 min   - standing >5 min   - in and out of a chair   - bending   - amb over uneven surfaces   - lifting   - dressing   - squatting   - housework       Objective:        Treatment Today     TREATMENT MINUTES COMMENTS   Evaluation     Self-care/ Home management     Manual therapy     Neuromuscular Re-education      Therapeutic Activity     Therapeutic Exercises 40 Supine bridging in hooklying x 10 reps   Hip lifts with glute sets x 4 breathes  90 90 hip lift on wall x 10 reps  TA bracing with reaching overhead x 10 reps   TA bracing with reaching towards feet with maintaining scap on table.   R abdominal activation needed over L   Pt given print out of new exercises   Gait training     Modality__________________                Total 40    Blank areas are intentional and mean the treatment did not include these items.     HEP:               PT Evaluation Code: (Please list factors)  Patient History/Comorbidities: stenosis, cauda equina compression  Examination: LE, gait, ROM, strength  Clinical Presentation: unstable   Clinical Decision Making: moderate    Patient History/  Comorbidities Examination  (body structures and functions, activity limitations, and/or participation restrictions) Clinical Presentation Clinical Decision Making (Complexity)   No documented Comorbidities or personal factors 1-2 Elements Stable and/or uncomplicated Low   1-2 documented comorbidities or personal  factor 3 Elements Evolving clinical presentation with changing characteristics Moderate   3-4 documented comorbidities or personal factors 4 or more Unstable and unpredictable High                  Hermila Delaney  5/2/2017  8:54 AM

## 2021-06-25 NOTE — PROGRESS NOTES
Progress Notes by Hermila Delaney PT at 4/24/2017  9:30 AM     Author: Hermila Delaney PT Service: -- Author Type: Physical Therapist    Filed: 4/24/2017 10:22 AM Encounter Date: 4/24/2017 Status: Signed    : Hermila Delanye PT (Physical Therapist)       [facility]   [facility]-Pelvic Follow up    Patient Name: Oma Gross  Date of evaluation: 4/24/2017  Referral Diagnosis: neurogenic claudication and decompresion laminectomy   Referring provider: Shahana Gomez CNP  Visits: 7/12  Visit Diagnosis:     ICD-10-CM    1. Lumbar stenosis with neurogenic claudication M48.06    2. Lumbar radiculopathy M54.16    3. Weakness of both hips M62.81    4. Decreased ROM of lumbar spine M25.60    5. Weakness of left hip M62.81        Assessment:    Pt presents S/p L2-5 decompressive laminectomies and neurogenic claudication. Pt is making steady progress in gluteal and abdominal strengthening as noted in increased duration tolerance without pain. Hip weakness continues to be greater in R glute med and L hip extension. Stride length has increased with gluteal activation. L UE n/t suggests myofascial pattern has progressed up pt's spine and should be address from cervical orientation next session. Pt would benefit from sklled physical therapy in order to address pt's deficits to decrease pain and radicular symptoms for walking and standing for extended durations without pain.   The POC is dynamic and will be modified on an ongoing basis.  Prognosis to achieve goals is  good   Pt. is appropriate for skilled PT intervention as outlined in the Plan of Care (POC).  Pt. is a good candidate for skilled PT services to improve pain levels and function.    Goals:  Pt. will demonstrate/verbalize independence in self-management of condition in : 6 weeks  Pt. will have improved quality of sleep: waking less times/night;in 6 weeks  Pt. will be able to walk : 30 minutes;on even surfaces;with less pain;in 6  weeks  Patient will stand : 30 minutes;with less pain;for home chores;in 6 weeks  Patient will transfer: sit/stand;for in/out of chair;with less pain;in 6 weeks  No Data Recorded    Patient's expectations/goals are realistic.    Barriers to Learning or Achieving Goals:  Chronicity of the problem.  Co-morbidities or other medical factors.  See below       Plan / Patient Instructions:      Plan for next visit: NuStep, follow nerve pathyways for MFR, NOT PRONE, Cervical MFR     Subjective:   Able to hold glutes for 10 sec and abdominals for 30 seconds finally this week. I was able to be outside for extended periods and work on garden things without pain. Low L hip/back and R calf tightness right now.     Functional limitations are described as occurring with:    - walking >10 min   - standing >5 min   - in and out of a chair   - bending   - amb over uneven surfaces   - lifting   - dressing   - squatting   - housework       Objective:        Treatment Today     TREATMENT MINUTES COMMENTS   Evaluation     Self-care/ Home management     Manual therapy     Neuromuscular Re-education 25 Supine D2 LE pattern in F and Ext each Le x 5 reps x 3 sets with VC to contract abdominals for stability  Seated UE diagonals with coordinated breathing x 10 reps L UE modified to decreased lever arm due to tingling  Supine Marching with TA activation x 10 reps each side. Pt notes light tingling in her face with marching pt had pt PPT and chin tuck with no change in symptoms with either.   Pt given print out of exercises this day.    Therapeutic Activity     Therapeutic Exercises     Gait training     Modality__________________                Total 25    Blank areas are intentional and mean the treatment did not include these items.     HEP:             PT Evaluation Code: (Please list factors)  Patient History/Comorbidities: stenosis, cauda equina compression  Examination: LE, gait, ROM, strength  Clinical Presentation: unstable   Clinical  Decision Making: moderate    Patient History/  Comorbidities Examination  (body structures and functions, activity limitations, and/or participation restrictions) Clinical Presentation Clinical Decision Making (Complexity)   No documented Comorbidities or personal factors 1-2 Elements Stable and/or uncomplicated Low   1-2 documented comorbidities or personal factor 3 Elements Evolving clinical presentation with changing characteristics Moderate   3-4 documented comorbidities or personal factors 4 or more Unstable and unpredictable High                  Hermila Delaney  4/24/2017  8:54 AM

## 2021-06-25 NOTE — PROGRESS NOTES
Progress Notes by Hermila Delaney PT at 4/6/2017 10:00 AM     Author: Hermila Delaney PT Service: -- Author Type: Physical Therapist    Filed: 4/6/2017  5:59 PM Encounter Date: 4/6/2017 Status: Signed    : Hermila Delaney PT (Physical Therapist)       [facility]   [facility]-Pelvic Follow up    Patient Name: Oma Gross  Date of evaluation: 4/6/2017  Referral Diagnosis: [unfilled]  Referring provider: Shahana Gomez CNP  Visits: 3/12  Visit Diagnosis:     ICD-10-CM    1. Lumbar stenosis with neurogenic claudication M48.06    2. Lumbar radiculopathy M54.16    3. Decreased ROM of lumbar spine M25.60    4. Weakness of both hips M62.81    5. Weakness of left hip M62.81        Assessment:    Pt presents S/p L2-5 decompressive laminectomies and neurogenic claudication.  L sided hip weakness >R and limited lumbar ROM in all directions. Difficulty maintaining sidelying positioning for extended periods >20 min due to decreased spinal support and weakness in abdominals and glutes. Pt notes decreased stiffness in LEs with manual techniques this day as pt is leaving. Pt would benefit from sklled physical therapy in order to address pt's deficits to decrease pain and radicular symptoms for walking and standing for extended durations without pain.   The POC is dynamic and will be modified on an ongoing basis.  Prognosis to achieve goals is  good   Pt. is appropriate for skilled PT intervention as outlined in the Plan of Care (POC).  Pt. is a good candidate for skilled PT services to improve pain levels and function.    Goals:  Pt. will demonstrate/verbalize independence in self-management of condition in : 6 weeks  Pt. will have improved quality of sleep: waking less times/night;in 6 weeks  Pt. will be able to walk : 30 minutes;on even surfaces;with less pain;in 6 weeks  Patient will stand : 30 minutes;with less pain;for home chores;in 6 weeks  Patient will transfer: sit/stand;for in/out of  "chair;with less pain;in 6 weeks  No Data Recorded    Patient's expectations/goals are realistic.    Barriers to Learning or Achieving Goals:  Chronicity of the problem.  Co-morbidities or other medical factors.  See below       Plan / Patient Instructions:      Plan for next visit: [facility]onia, follow nerve pathyways for MFR, NOT PRONE,      Subjective:   Exercises are going well I have been achey but not as sharp of the pain. I made the mistake of driving over to a neighbors, whom i thought was having a heart attack only a block and a half away. I thought \" I can make it that far.\" But I have been so sore and \"paying for it\" all day today.     Functional limitations are described as occurring with:    - walking >10 min   - standing >5 min   - in and out of a chair   - bending   - amb over uneven surfaces   - lifting   - dressing   - squatting   - housework       Objective:        Treatment Today     TREATMENT MINUTES COMMENTS   Evaluation     Self-care/ Home management     Manual therapy 25  Sidelying MFR B glutes with the fibers and against the fibers; gentle pressure as pt is very sensitive. Pt had to switch sides half way through    Neuromuscular Re-education  Discussed towel between rib cage and hip for neutral spine with sleeping on pt's side.    Therapeutic Activity     Therapeutic Exercises 5 Seated in slight flexion alternating UEs for scapular and abdominal activation    Gait training     Modality__________________                Total 30    Blank areas are intentional and mean the treatment did not include these items.     HEP:             PT Evaluation Code: (Please list factors)  Patient History/Comorbidities: stenosis, cauda equina compression  Examination: LE, gait, ROM, strength  Clinical Presentation: unstable   Clinical Decision Making: moderate    Patient History/  Comorbidities Examination  (body structures and functions, activity limitations, and/or participation restrictions) Clinical Presentation " Clinical Decision Making (Complexity)   No documented Comorbidities or personal factors 1-2 Elements Stable and/or uncomplicated Low   1-2 documented comorbidities or personal factor 3 Elements Evolving clinical presentation with changing characteristics Moderate   3-4 documented comorbidities or personal factors 4 or more Unstable and unpredictable High                  Hermila Delaney  4/6/2017  8:54 AM

## 2021-06-25 NOTE — PROGRESS NOTES
Progress Notes by Víctor Mallory PT at 6/27/2017 11:00 AM     Author: Víctor Mallory PT Service: -- Author Type: Physical Therapist    Filed: 6/27/2017  2:57 PM Encounter Date: 6/27/2017 Status: Signed    : Víctor Mallory PT (Physical Therapist)       [facility]   [facility]-Pelvic Follow up    Patient Name: Oma Gross  Date of evaluation: 6/27/2017  Referral Diagnosis: neurogenic claudication and decompresion laminectomy   Referring provider: Shahana Gomez CNP  Visits: 19  Visit Diagnosis:     ICD-10-CM    1. Lumbar stenosis with neurogenic claudication M48.06    2. Weakness of both hips M62.81    3. Lumbar radiculopathy M54.16    4. Decreased ROM of lumbar spine M25.60    5. Weakness of left hip M62.81        Assessment:       Patient tolerated manual therapy well with some tolerable discomfort during treatment and report of decreased pain post treatment.  She reports that her leg did not feel as heavy post treatment and was more comfortable standing.    Pt presents S/p L2-5 decompressive laminectomies and neurogenic claudication. Pt demonstrates significant difference in L to R hip strength with L demonstrating increased weakness compared to R in tandem stance. Pt would benefit from sklled physical therapy in order to address pt's deficits to decrease pain and radicular symptoms for walking and standing for extended durations without pain.   The POC is dynamic and will be modified on an ongoing basis.  Prognosis to achieve goals is  good   Pt. is appropriate for skilled PT intervention as outlined in the Plan of Care (POC).  Pt. is a good candidate for skilled PT services to improve pain levels and function.    Goals:  Pt. will demonstrate/verbalize independence in self-management of condition in : 6 weeks Goal met  Pt. will have improved quality of sleep: waking less times/night;in 6 weeks Progressing  Pt. will be able to walk : 30 minutes;on even surfaces;with less  pain;in 6 weeks Goal met  Patient will stand : 30 minutes;with less pain;for home chores;in 6 weeks Progressing  Patient will transfer: sit/stand;for in/out of chair;with less pain;in 6 weeks Goal Met  No Data Recorded       Plan / Patient Instructions:      Plan for next visit: possibly progress to standing hip extension      Subjective:     5/10 pain in the back.  Left side of back feels very tight.  Right leg feels very heavy and weak.  Some exercises (leg) 2x/day.  Some 3-4x/day.  Leg exercises give her the most problem, tight burning in left low back, knees burn with the step exercises.    Functional limitations are described as occurring with.:    - walking >10 min   - standing >5 min   - in and out of a chair   - bending   - amb over uneven surfaces   - lifting   - dressing   - squatting   - housework       Objective:      Limited quads and hip flexors bilaterally.      Treatment Today     TREATMENT MINUTES COMMENTS   Evaluation     Self-care/ Home management     Manual therapy 27 MFR layers 1-3 left glut max, multifidus, piriformis, bilateral hamstrings and quadriceps.   Neuromuscular Re-education     Therapeutic Activity     Therapeutic Exercises 3 Verbal review of HEP and activity.   Gait training     Modality__________________                Total 30    Blank areas are intentional and mean the treatment did not include these items.     HEP:                     PT Evaluation Code: (Please list factors)  Patient History/Comorbidities: stenosis, cauda equina compression  Examination: LE, gait, ROM, strength  Clinical Presentation: unstable   Clinical Decision Making: moderate    Patient History/  Comorbidities Examination  (body structures and functions, activity limitations, and/or participation restrictions) Clinical Presentation Clinical Decision Making (Complexity)   No documented Comorbidities or personal factors 1-2 Elements Stable and/or uncomplicated Low   1-2 documented comorbidities or personal  factor 3 Elements Evolving clinical presentation with changing characteristics Moderate   3-4 documented comorbidities or personal factors 4 or more Unstable and unpredictable High                  Víctor Mallory  6/27/2017  8:54 AM

## 2021-06-25 NOTE — PROGRESS NOTES
Progress Notes by Hermila Delaney PT at 4/27/2017 11:30 AM     Author: Hermila Delaney PT Service: -- Author Type: Physical Therapist    Filed: 4/27/2017  1:46 PM Encounter Date: 4/27/2017 Status: Signed    : Hermila Delaney PT (Physical Therapist)       [facility]   [facility]-Pelvic Follow up    Patient Name: Oma Gross  Date of evaluation: 4/27/2017  Referral Diagnosis: neurogenic claudication and decompresion laminectomy   Referring provider: Shahana Gomez CNP  Visits: 8/12  Visit Diagnosis:     ICD-10-CM    1. Lumbar stenosis with neurogenic claudication M48.06    2. Lumbar radiculopathy M54.16    3. Weakness of both hips M62.81    4. Decreased ROM of lumbar spine M25.60    5. Weakness of left hip M62.81        Assessment:    Pt presents S/p L2-5 decompressive laminectomies and neurogenic claudication. Pt is making steady progress in gluteal and abdominal strengthening as noted in increased duration tolerance without pain. Sit to stand from standard height chair pt is unable to complete without UEs; able to complete from elevated surface.  Stride length has increased with gluteal activation. PT recommended for pt to slowly incorporate exercises into her routine. Pt would benefit from sklled physical therapy in order to address pt's deficits to decrease pain and radicular symptoms for walking and standing for extended durations without pain.   The POC is dynamic and will be modified on an ongoing basis.  Prognosis to achieve goals is  good   Pt. is appropriate for skilled PT intervention as outlined in the Plan of Care (POC).  Pt. is a good candidate for skilled PT services to improve pain levels and function.    Goals:  Pt. will demonstrate/verbalize independence in self-management of condition in : 6 weeks  Pt. will have improved quality of sleep: waking less times/night;in 6 weeks  Pt. will be able to walk : 30 minutes;on even surfaces;with less pain;in 6 weeks  Patient will  stand : 30 minutes;with less pain;for home chores;in 6 weeks  Patient will transfer: sit/stand;for in/out of chair;with less pain;in 6 weeks  No Data Recorded    Patient's expectations/goals are realistic.    Barriers to Learning or Achieving Goals:  Chronicity of the problem.  Co-morbidities or other medical factors.  See below       Plan / Patient Instructions:      Plan for next visit: NuStep, follow nerve pathyways for MFR, NOT PRONE, Cervical MFR     Subjective:   I am feeling so much better. i have my  doing the exercise with me now.     Functional limitations are described as occurring with:    - walking >10 min   - standing >5 min   - in and out of a chair   - bending   - amb over uneven surfaces   - lifting   - dressing   - squatting   - housework       Objective:        Treatment Today     TREATMENT MINUTES COMMENTS   Evaluation     Self-care/ Home management     Manual therapy     Neuromuscular Re-education      Therapeutic Activity     Therapeutic Exercises 25 Reviewed D2 supine exercises with LEs  Sit to stands with demonstration and VC for alignment; discussed partial sit to stands if tall chair is unavailable  Walking backwards for hip extension x 4 reps x 15 reps   Seated hip ADD isometrics with whatever pt is tolerated.   Gait training     Modality__________________                Total 25    Blank areas are intentional and mean the treatment did not include these items.     HEP:             PT Evaluation Code: (Please list factors)  Patient History/Comorbidities: stenosis, cauda equina compression  Examination: LE, gait, ROM, strength  Clinical Presentation: unstable   Clinical Decision Making: moderate    Patient History/  Comorbidities Examination  (body structures and functions, activity limitations, and/or participation restrictions) Clinical Presentation Clinical Decision Making (Complexity)   No documented Comorbidities or personal factors 1-2 Elements Stable and/or uncomplicated Low    1-2 documented comorbidities or personal factor 3 Elements Evolving clinical presentation with changing characteristics Moderate   3-4 documented comorbidities or personal factors 4 or more Unstable and unpredictable High                  Hermila Delaney  4/27/2017  8:54 AM

## 2021-06-25 NOTE — PROGRESS NOTES
Progress Notes by Hermila Delaney PT at 3/30/2017  9:00 AM     Author: Hermila Delaney PT Service: -- Author Type: Physical Therapist    Filed: 4/5/2017  2:08 PM Encounter Date: 3/30/2017 Status: Signed    : Shahana Gomez CNP (Nurse Practitioner)    Related Notes: Original Note by Hermila Delaney PT (Physical Therapist) filed at 3/30/2017  6:14 PM           [facility] Certification Request    March 30, 2017      Patient: Oma Gross  MR Number: [id number]  YOB: 1950  Date of Visit: 3/30/2017      Dear Dr. Gomez:    Thank you for this referral.   We are seeing Oma Gross for Physical Therapy of lumbar spine.    Medicare and/or Medicaid requires physician review and approval of the treatment plan. Please review the plan of care and verify that you agree with the therapy plan of care by co-signing this note.      Plan of Care  Authorization / Certification Start Date: 03/30/17  Authorization / Certification End Date: 06/28/17  Authorization / Certification Number of Visits: 12  Communication with: Referral Source  Patient Related Instruction: Nature of Condition;Treatment plan and rationale;Self Care instruction;Body mechanics;Posture  Times per Week: 1-2  Number of Weeks: 6  Number of Visits: 12  Therapeutic Exercise: ROM;Stretching;Strengthening  Neuromuscular Reeducation: kinesio tape;posture;balance/proprioception;TNE;postural restoration;core  Manual Therapy: soft tissue mobilization;myofascial release;joint mobilization;muscle energy;strain counterstrain  Gait Training: as indicated     Goals:  Pt. will demonstrate/verbalize independence in self-management of condition in : 6 weeks  Pt. will have improved quality of sleep: waking less times/night;in 6 weeks  Pt. will be able to walk : 30 minutes;on even surfaces;with less pain;in 6 weeks  Patient will stand : 30 minutes;with less pain;for home chores;in 6 weeks  Patient will transfer: sit/stand;for in/out  of chair;with less pain;in 6 weeks  No Data Recorded      If you have any questions or concerns, please don't hesitate to call.    Sincerely,      Hermila Delaney, PT        Physician recommendation:     _X__ Follow therapist's recommendation        ___ Modify therapy      *Physician co-signature indicates they certify the need for these services furnished within this plan and while under their care.      [facility]   Lumbo-Pelvic Initial Evaluation    Patient Name: Oma Gross  Date of evaluation: 3/30/2017  Referral Diagnosis: [unfilled]  Referring provider: Shahana Gomez CNP  Visits: 1/12  Visit Diagnosis:     ICD-10-CM    1. Lumbar stenosis with neurogenic claudication M48.06    2. Lumbar radiculopathy M54.16    3. Decreased ROM of lumbar spine M25.60    4. Hip weakness M62.81    5. Weakness of left hip M62.81        Assessment:    Pt presents S/p L2-5 decompressive laminectomies and neurogenic claudication. Pt demonstrates radicular symptoms in B LE anterior and posterior that are intermittent and variable in location. L sided hip weakness >R and limited lumbar ROM in all directions. Pt notes pain with prone lying and increased low back symptoms with just ROM aspect of Evaluation. Pt would benefit from skilled physical therapy in order to address pt's deficits to decrease pain and radicular symptoms for walking and standing for extended durations without pain.   The POC is dynamic and will be modified on an ongoing basis.  Prognosis to achieve goals is  good   Pt. is appropriate for skilled PT intervention as outlined in the Plan of Care (POC).  Pt. is a good candidate for skilled PT services to improve pain levels and function.    Goals:  Pt. will demonstrate/verbalize independence in self-management of condition in : 6 weeks  Pt. will have improved quality of sleep: waking less times/night;in 6 weeks  Pt. will be able to walk : 30 minutes;on even surfaces;with less pain;in 6  weeks  Patient will stand : 30 minutes;with less pain;for home chores;in 6 weeks  Patient will transfer: sit/stand;for in/out of chair;with less pain;in 6 weeks  No Data Recorded    Patient's expectations/goals are realistic.    Barriers to Learning or Achieving Goals:  Chronicity of the problem.  Co-morbidities or other medical factors.  See below       Plan / Patient Instructions:      Plan of Care:   Authorization / Certification Start Date: 03/30/17  Authorization / Certification End Date: 06/28/17  Authorization / Certification Number of Visits: 12  Communication with: Referral Source  Patient Related Instruction: Nature of Condition;Treatment plan and rationale;Self Care instruction;Body mechanics;Posture  Times per Week: 1-2  Number of Weeks: 6  Number of Visits: 12  Therapeutic Exercise: ROM;Stretching;Strengthening  Neuromuscular Reeducation: kinesio tape;posture;balance/proprioception;TNE;postural restoration;core  Manual Therapy: soft tissue mobilization;myofascial release;joint mobilization;muscle energy;strain counterstrain  Gait Training: as indicated     Plan for next visit: [facility], follow nerve pathyways for MFR, NOT PRONE,      Subjective:     History of Present Illness:    Oma is a 66 y.o. female who presents to therapy today status post bilateral decompressive laminectomy L2-L5 on 2/15/2017 with signs of neurogenic claudication.   - prolonged standing creates low back pain   Location(s) thighs in the front and the calves, across the back and down into the hips    - thighs feel like a muscle cramp, calves are tingling with bugs crawling up and down    - 10 lbs of sausage in a 5 lb caseing   - no compression socks   - low back is an ache general   -   Symptoms are intermittent and not improving. The amount of time that it is bad has lessened. In the legs its the same as its always been.    - 1994 pt fell at work needing surgery on her ankle she kept falling down   History of similar  symptoms/injury: no prior back surgeries  Previous level of function: hard to sit, walking <city block,    - walking and moving is fine then stopping she cramps up   Current activities: reading, crafts, rearranging things, decorating, baking,       Pain Ratin  Pain rating at best: 4 with sitting  Pain rating at worst: 10 with standing and walking   Pain description: aching, burning and sharp    Functional limitations are described as occurring with:    - walking >10 min   - standing >5 min   - in and out of a chair   - bending   - amb over uneven surfaces   - lifting   - dressing   - squatting   - housework       Objective:      Note: Items left blank indicates the item was not performed or not indicated at the time of the evaluation.    Patient Outcome Measures :    Modified Oswestry Low Back Pain Disablity Questionnaire  in %: 62   Scores range from 0-100%, where a score of 0% represents minimal pain and maximal function. The minimal clinically important difference is a score reduction of 12%.    Examination  1. Lumbar stenosis with neurogenic claudication     2. Lumbar radiculopathy     3. Decreased ROM of lumbar spine     4. Hip weakness     5. Weakness of left hip       Precautions/Restrictions: can lift 10 lbs, bending, lifting, twisting    Involved side: Bilateral    Posture Observation:    -Sitting: wnl   - Standing: stands WB on LLE, leans to the R, B knee F,     Gait Observations: ambulates with 4WW, flexed posture B LE ER      Lumbar ROM:  Within normal limits unless otherwise indicated  Date:      *Indicate scale AROM AROM AROM   Lumbar Flexion Limited with restrictions     Lumbar Extension Unable to fully straighten d/temporal arteritis pain      Right Left Right Left Right Left   Lumbar Sidebending 20 degrees of motion B       Lumbar Rotation NT d/t restrictions NT d/t restrictions       Thoracic Flexion      Thoracic Extension      Thoracic Sidebending         Thoracic Rotation             Lower  Extremity Strength: Within normal limits unless otherwise indicated     Date:      LE strength/5 Right Left Right Left Right Left   Hip Flexion (L1-3)         Hip Extension (L5-S1)  3       Hip Abduction (L4-5)  3       Hip Adduction (L2-3)  4-       Hip External Rotation         Hip Internal Rotation         Knee Extension (L3-4)         Knee Flexion         Ankle Dorsiflexion (L4-5) P        Great Toe Extension (L5)         Ankle Plantar flexion (S1)         Abdominals            Lumbar Special Tests:     Lumbar Special Tests Right Left SI Tests Right  Left   Quadrant test   SI Compression - -   Straight leg raise + + SI Distraction - -   Crossover response   POSH Test     Slump   Sacral Thrust     Sit-up test  FADIR     Trunk extensor endurance test  Resisted Abduction     Prone instability test  Other:     Pubic shotgun  Other:         Passive Mobility - Joint Integrity:  PA Testing: PT  Palpation: L LE weakness and glute med MFR on L,     Treatment Today     TREATMENT MINUTES COMMENTS   Evaluation 25 Lumbar spine, neurogenic claudication   Self-care/ Home management     Manual therapy     Neuromuscular Re-education     Therapeutic Activity     Therapeutic Exercises 15 See Below   Gait training 8 Pt amb with B ER so not to over stretch nerves in plantar aspect of foot dt pain.   Able to amb forwards and backwards with 4WW safely with good posture.   Tends to lean to R with increased pressure on 4WW handles.    Modality__________________                Total     Blank areas are intentional and mean the treatment did not include these items.     HEP:       PT Evaluation Code: (Please list factors)  Patient History/Comorbidities: stenosis, cauda equina compression  Examination: LE, gait, ROM, strength  Clinical Presentation: unstable   Clinical Decision Making: moderate    Patient History/  Comorbidities Examination  (body structures and functions, activity limitations, and/or participation restrictions) Clinical  Presentation Clinical Decision Making (Complexity)   No documented Comorbidities or personal factors 1-2 Elements Stable and/or uncomplicated Low   1-2 documented comorbidities or personal factor 3 Elements Evolving clinical presentation with changing characteristics Moderate   3-4 documented comorbidities or personal factors 4 or more Unstable and unpredictable High                  Hermila Delaney  3/30/2017  8:54 AM

## 2021-06-25 NOTE — PROGRESS NOTES
Progress Notes by Hermila Delaney PT at 4/20/2017 10:00 AM     Author: Hermila Delaney PT Service: -- Author Type: Physical Therapist    Filed: 4/20/2017  6:50 PM Encounter Date: 4/20/2017 Status: Signed    : Hermila Delaney PT (Physical Therapist)       [facility]   Lumbo-Pelvic Follow up    Patient Name: Oma Gross  Date of evaluation: 4/20/2017  Referral Diagnosis: neurogenic claudication and decompresion laminectomy   Referring provider: Shahana Gomez CNP  Visits: 6/12  Visit Diagnosis:     ICD-10-CM    1. Lumbar stenosis with neurogenic claudication M48.06    2. Lumbar radiculopathy M54.16    3. Decreased ROM of lumbar spine M25.60    4. Weakness of both hips M62.81        Assessment:    Pt presents S/p L2-5 decompressive laminectomies and neurogenic claudication.  L sided hip weakness continues to be greater than R and limited lumbar ROM in all directions. Restrictions and shortness noted by PT in glutes this day with manual techniques. Pt notes increased gluteal ability to contract in sitting after manual techniques this day.  Pt would benefit from sklled physical therapy in order to address pt's deficits to decrease pain and radicular symptoms for walking and standing for extended durations without pain.   The POC is dynamic and will be modified on an ongoing basis.  Prognosis to achieve goals is  good   Pt. is appropriate for skilled PT intervention as outlined in the Plan of Care (POC).  Pt. is a good candidate for skilled PT services to improve pain levels and function.    Goals:  Pt. will demonstrate/verbalize independence in self-management of condition in : 6 weeks  Pt. will have improved quality of sleep: waking less times/night;in 6 weeks  Pt. will be able to walk : 30 minutes;on even surfaces;with less pain;in 6 weeks  Patient will stand : 30 minutes;with less pain;for home chores;in 6 weeks  Patient will transfer: sit/stand;for in/out of chair;with less pain;in  6 weeks  No Data Recorded    Patient's expectations/goals are realistic.    Barriers to Learning or Achieving Goals:  Chronicity of the problem.  Co-morbidities or other medical factors.  See below       Plan / Patient Instructions:      Plan for next visit: [facility], follow nerve pathyways for MFR, NOT PRONE,      Subjective:   I cannot use a regular walker. 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 But boy did i pay for it the rest of the time.     Functional limitations are described as occurring with:    - walking >10 min   - standing >5 min   - in and out of a chair   - bending   - amb over uneven surfaces   - lifting   - dressing   - squatting   - housework       Objective:        Treatment Today     TREATMENT MINUTES COMMENTS   Evaluation     Self-care/ Home management     Manual therapy 30 Supine with hip/knee 90/90 STM to glutes medial along sacral ridge to lateral by greater trochanter B. Myofascial restrictions greater distally than proximally. HF STM B. Pt notes tenderness at all restriction points   Neuromuscular Re-education     Therapeutic Activity     Therapeutic Exercises     Gait training     Modality__________________                Total 30    Blank areas are intentional and mean the treatment did not include these items.     HEP:             PT Evaluation Code: (Please list factors)  Patient History/Comorbidities: stenosis, cauda equina compression  Examination: LE, gait, ROM, strength  Clinical Presentation: unstable   Clinical Decision Making: moderate    Patient History/  Comorbidities Examination  (body structures and functions, activity limitations, and/or participation restrictions) Clinical Presentation Clinical Decision Making (Complexity)   No documented Comorbidities or personal factors 1-2 Elements Stable and/or uncomplicated Low   1-2 documented comorbidities or personal factor 3 Elements  Evolving clinical presentation with changing characteristics Moderate   3-4 documented comorbidities or personal factors 4 or more Unstable and unpredictable High                  Hermila Delaney  4/20/2017  8:54 AM

## 2021-06-25 NOTE — PROGRESS NOTES
Progress Notes by Hermila Delaney PT at 5/9/2017 12:30 PM     Author: Hermila Delaney PT Service: -- Author Type: Physical Therapist    Filed: 5/9/2017  1:49 PM Encounter Date: 5/9/2017 Status: Signed    : Hermila Delaney PT (Physical Therapist)       [facility]   Lumbo-Pelvic Follow up    Patient Name: Oma Gross  Date of evaluation: 5/9/2017  Referral Diagnosis: neurogenic claudication and decompresion laminectomy   Referring provider: Shahana Gomez CNP  Visits: 10/12  Visit Diagnosis:     ICD-10-CM    1. Lumbar stenosis with neurogenic claudication M48.06    2. Lumbar radiculopathy M54.16    3. Weakness of both hips M62.81    4. Decreased ROM of lumbar spine M25.60    5. Weakness of left hip M62.81        Assessment:    Pt presents S/p L2-5 decompressive laminectomies and neurogenic claudication. Pt is making steady progress in gluteal and abdominal strengthening as noted in increased duration of walking and walking without a walker. Pt has a history of chest wall weakness and can feel pressure in her face with gentle cervical palpations. Suboccipital holds with gentle pressure pt notes moderate pain in top of her head and with downward pressure, mild, to upper trap area pt is severely nausious suggesting significantly increased vascular pressure that is ischemic with light pressure.  Pt would benefit from sklled physical therapy in order to address pt's deficits to decrease pain and radicular symptoms for walking and standing for extended durations without pain.   The POC is dynamic and will be modified on an ongoing basis.  Prognosis to achieve goals is  good   Pt. is appropriate for skilled PT intervention as outlined in the Plan of Care (POC).  Pt. is a good candidate for skilled PT services to improve pain levels and function.    Goals:  Pt. will demonstrate/verbalize independence in self-management of condition in : 6 weeks  Pt. will have improved quality of sleep:  waking less times/night;in 6 weeks  Pt. will be able to walk : 30 minutes;on even surfaces;with less pain;in 6 weeks  Patient will stand : 30 minutes;with less pain;for home chores;in 6 weeks  Patient will transfer: sit/stand;for in/out of chair;with less pain;in 6 weeks  No Data Recorded       Plan / Patient Instructions:      Plan for next visit: possibly progress to standing hip extension      Subjective:   I Have been walking more outside around my house without my walker. When I am in places that I don't know so well I don't feel as comfortable without my walker.     Functional limitations are described as occurring with:    - walking >10 min   - standing >5 min   - in and out of a chair   - bending   - amb over uneven surfaces   - lifting   - dressing   - squatting   - housework       Objective:        Treatment Today     TREATMENT MINUTES COMMENTS   Evaluation     Self-care/ Home management     Manual therapy     Neuromuscular Re-education      Therapeutic Activity     Therapeutic Exercises 40 Supine TA bracing with LE in 90/90:  - reaching B to ankles with VC for scap squeezes as pt tends to protract with reaching.   - Reviewed X pattern with clasped hands each side for technique  Attempted R lateral lower cervical glides, but pt notes increased feelings of pressure in her face. With downward pressure to her traps, pt notes severe nausea  Seated and supine Rhomboid and middle trap activation   Gait training     Modality__________________                Total 40    Blank areas are intentional and mean the treatment did not include these items.     HEP:               PT Evaluation Code: (Please list factors)  Patient History/Comorbidities: stenosis, cauda equina compression  Examination: LE, gait, ROM, strength  Clinical Presentation: unstable   Clinical Decision Making: moderate    Patient History/  Comorbidities Examination  (body structures and functions, activity limitations, and/or participation  restrictions) Clinical Presentation Clinical Decision Making (Complexity)   No documented Comorbidities or personal factors 1-2 Elements Stable and/or uncomplicated Low   1-2 documented comorbidities or personal factor 3 Elements Evolving clinical presentation with changing characteristics Moderate   3-4 documented comorbidities or personal factors 4 or more Unstable and unpredictable High                  Hermila Delaney  5/9/2017  8:54 AM

## 2021-06-25 NOTE — PROGRESS NOTES
Progress Notes by Hermila Delaney PT at 5/23/2017 12:30 PM     Author: Hermila Delaney PT Service: -- Author Type: Physical Therapist    Filed: 5/23/2017  1:34 PM Encounter Date: 5/23/2017 Status: Signed    : Hermila Delaney PT (Physical Therapist)       [facility]   Lumbo-Pelvic Follow up    Patient Name: Oma Gross  Date of evaluation: 5/23/2017  Referral Diagnosis: neurogenic claudication and decompresion laminectomy   Referring provider: Shahana Gomez CNP  Visits: 13  Visit Diagnosis:     ICD-10-CM    1. Lumbar stenosis with neurogenic claudication M48.06    2. Lumbar radiculopathy M54.16    3. Weakness of both hips M62.81    4. Decreased ROM of lumbar spine M25.60    5. Weakness of left hip M62.81        Assessment:    Pt presents S/p L2-5 decompressive laminectomies and neurogenic claudication. Pt is making steady progress in gluteal and abdominal strengthening as noted in increased activity tolerance, gardening, at home. With abdominal exercises this day pt noted decrease in low back pain standing hip extension and ABD pt notes fatigue in hips and new warm sensations due to increased blood flow. Pt would benefit from sklled physical therapy in order to address pt's deficits to decrease pain and radicular symptoms for walking and standing for extended durations without pain.   The POC is dynamic and will be modified on an ongoing basis.  Prognosis to achieve goals is  good   Pt. is appropriate for skilled PT intervention as outlined in the Plan of Care (POC).  Pt. is a good candidate for skilled PT services to improve pain levels and function.    Goals:  Pt. will demonstrate/verbalize independence in self-management of condition in : 6 weeks  Pt. will have improved quality of sleep: waking less times/night;in 6 weeks  Pt. will be able to walk : 30 minutes;on even surfaces;with less pain;in 6 weeks Goal met  Patient will stand : 30 minutes;with less pain;for home chores;in 6  weeks Progressing  Patient will transfer: sit/stand;for in/out of chair;with less pain;in 6 weeks Goal Met  No Data Recorded       Plan / Patient Instructions:      Plan for next visit: possibly progress to standing hip extension      Subjective:     I still have the most difficulty standing at [facility] for a long time. My L side seems lower than my R. I was able to be in the garden for a bit without my back yelling at me.     Functional limitations are described as occurring with:    - walking >10 min   - standing >5 min   - in and out of a chair   - bending   - amb over uneven surfaces   - lifting   - dressing   - squatting   - housework       Objective:        Treatment Today     TREATMENT MINUTES COMMENTS   Evaluation     Self-care/ Home management     Manual therapy     Neuromuscular Re-education      Therapeutic Activity     Therapeutic Exercises 30 Seated chop and lift x 10 reps each side VC for reaching out with finger tips and to keep ribs down  Seated Rotations with TA bracing with UE at shoulder height x 10 reps x 2 sets   Abdominal stretching with L UE above head in sitting and R hand reaching down.   Standing hip extension and ABd x 10 reps x 2 sets each  Pt walking in between for reset with PT emphasizing posture for abdominal activation    Gait training     Modality__________________                Total 30    Blank areas are intentional and mean the treatment did not include these items.     HEP:                     PT Evaluation Code: (Please list factors)  Patient History/Comorbidities: stenosis, cauda equina compression  Examination: LE, gait, ROM, strength  Clinical Presentation: unstable   Clinical Decision Making: moderate    Patient History/  Comorbidities Examination  (body structures and functions, activity limitations, and/or participation restrictions) Clinical Presentation Clinical Decision Making (Complexity)   No documented Comorbidities or personal factors 1-2 Elements Stable and/or  uncomplicated Low   1-2 documented comorbidities or personal factor 3 Elements Evolving clinical presentation with changing characteristics Moderate   3-4 documented comorbidities or personal factors 4 or more Unstable and unpredictable High                  Hermila Delaney  5/23/2017  8:54 AM

## 2021-06-25 NOTE — PROGRESS NOTES
Progress Notes by Hermila Delaney PT at 5/30/2017 12:30 PM     Author: Hermila Delaney PT Service: -- Author Type: Physical Therapist    Filed: 5/30/2017  4:02 PM Encounter Date: 5/30/2017 Status: Signed    : Hermila Delaney PT (Physical Therapist)       [facility]   Lumbo-Pelvic Follow up    Patient Name: Oma Gross  Date of evaluation: 5/30/2017  Referral Diagnosis: neurogenic claudication and decompresion laminectomy   Referring provider: Shahana Gomez CNP  Visits: 15  Visit Diagnosis:     ICD-10-CM    1. Lumbar stenosis with neurogenic claudication M48.06    2. Weakness of both hips M62.81    3. Lumbar radiculopathy M54.16    4. Decreased ROM of lumbar spine M25.60    5. Weakness of left hip M62.81        Assessment:    Pt presents S/p L2-5 decompressive laminectomies and neurogenic claudication. Pt notes decrease in soreness with changing to tennis shoes rather than her [employer]. Pt also notes significant increase in glute and posterior thigh muscular tone. Pt is making steady progress in gluteal and abdominal strengthening as noted in increased activity tolerance and fatigue instead of pain in low back with exertion. R glute med is significantly shortened and appears weaker with side stepping to the L as pt has a difficult time in ADD to step in with the R foot. PT notes balance has significantly improved since last visit as noted with ladder exercises.  Pt would benefit from sklled physical therapy in order to address pt's deficits to decrease pain and radicular symptoms for walking and standing for extended durations without pain.   The POC is dynamic and will be modified on an ongoing basis.  Prognosis to achieve goals is  good   Pt. is appropriate for skilled PT intervention as outlined in the Plan of Care (POC).  Pt. is a good candidate for skilled PT services to improve pain levels and function.    Goals:  Pt. will demonstrate/verbalize independence in  self-management of condition in : 6 weeks Goal met  Pt. will have improved quality of sleep: waking less times/night;in 6 weeks Progressing  Pt. will be able to walk : 30 minutes;on even surfaces;with less pain;in 6 weeks Goal met  Patient will stand : 30 minutes;with less pain;for home chores;in 6 weeks Progressing  Patient will transfer: sit/stand;for in/out of chair;with less pain;in 6 weeks Goal Met  No Data Recorded       Plan / Patient Instructions:      Plan for next visit: possibly progress to standing hip extension      Subjective:     I was able to work more in the garden. My low back hasn't been as stiff. I found that my shoes were really just too heavy and now that I switched to tennis shoes its been a lot easier.     Functional limitations are described as occurring with:    - walking >10 min   - standing >5 min   - in and out of a chair   - bending   - amb over uneven surfaces   - lifting   - dressing   - squatting   - housework       Objective:        Treatment Today     TREATMENT MINUTES COMMENTS   Evaluation     Self-care/ Home management     Manual therapy     Neuromuscular Re-education      Therapeutic Activity     Therapeutic Exercises     Gait training 30 With ladder attempt to increase stride length by skipping boxes walking forwards  Karaoke each side x 10 ft x 3 sets     Walking over flat ground: backwards with VC for TA activation and PPT.   L LE lateral step ups x 10 reps  Backwards step ups x 10 reps each side    Modality__________________                Total 30    Blank areas are intentional and mean the treatment did not include these items.     HEP:                     PT Evaluation Code: (Please list factors)  Patient History/Comorbidities: stenosis, cauda equina compression  Examination: LE, gait, ROM, strength  Clinical Presentation: unstable   Clinical Decision Making: moderate    Patient History/  Comorbidities Examination  (body structures and functions, activity limitations,  and/or participation restrictions) Clinical Presentation Clinical Decision Making (Complexity)   No documented Comorbidities or personal factors 1-2 Elements Stable and/or uncomplicated Low   1-2 documented comorbidities or personal factor 3 Elements Evolving clinical presentation with changing characteristics Moderate   3-4 documented comorbidities or personal factors 4 or more Unstable and unpredictable High                  Hermila Delaney  5/30/2017  8:54 AM

## 2021-06-25 NOTE — PROGRESS NOTES
Progress Notes by Hermila Delaney PT at 5/25/2017 12:30 PM     Author: Hermila Delaney PT Service: -- Author Type: Physical Therapist    Filed: 5/25/2017  1:49 PM Encounter Date: 5/25/2017 Status: Signed    : Hermila Delaney PT (Physical Therapist)       [facility]   [facility]-Pelvic Follow up    Patient Name: Oma Gross  Date of evaluation: 5/25/2017  Referral Diagnosis: neurogenic claudication and decompresion laminectomy   Referring provider: Shahana Gomez CNP  Visits: 14  Visit Diagnosis:     ICD-10-CM    1. Lumbar stenosis with neurogenic claudication M48.06    2. Lumbar radiculopathy M54.16    3. Weakness of both hips M62.81    4. Decreased ROM of lumbar spine M25.60        Assessment:    Pt presents S/p L2-5 decompressive laminectomies and neurogenic claudication. Pt is making steady progress in gluteal and abdominal strengthening as noted in increased activity tolerance and fatigue instead of pain in low back with exertion. R glute med is significantly shortened and appears weaker with side stepping to the L as pt has a difficult time in ADD to step in with the R foot.  Pt would benefit from sklled physical therapy in order to address pt's deficits to decrease pain and radicular symptoms for walking and standing for extended durations without pain.   The POC is dynamic and will be modified on an ongoing basis.  Prognosis to achieve goals is  good   Pt. is appropriate for skilled PT intervention as outlined in the Plan of Care (POC).  Pt. is a good candidate for skilled PT services to improve pain levels and function.    Goals:  Pt. will demonstrate/verbalize independence in self-management of condition in : 6 weeks Goal met  Pt. will have improved quality of sleep: waking less times/night;in 6 weeks Progressing  Pt. will be able to walk : 30 minutes;on even surfaces;with less pain;in 6 weeks Goal met  Patient will stand : 30 minutes;with less pain;for home chores;in 6 weeks  Progressing  Patient will transfer: sit/stand;for in/out of chair;with less pain;in 6 weeks Goal Met  No Data Recorded       Plan / Patient Instructions:      Plan for next visit: possibly progress to standing hip extension      Subjective:     I am really stiff in my low back today. The L low back hurts and the R front of the hip hurts. I think I over did it yesterday by holding the seated reverse crunches for 8 seconds.     Functional limitations are described as occurring with:    - walking >10 min   - standing >5 min   - in and out of a chair   - bending   - amb over uneven surfaces   - lifting   - dressing   - squatting   - housework       Objective:        Treatment Today     TREATMENT MINUTES COMMENTS   Evaluation     Self-care/ Home management  Answered pt's questions about toes curling and why she was short of breathe and what she is doing wrong   Manual therapy     Neuromuscular Re-education      Therapeutic Activity     Therapeutic Exercises 10 Attempted prone lying- pt required R hip ABD for decreased low back pain, pillows under hips did not change pain   Standing HF stretching each side, R side performed with IR      Gait training 30 With ladder attempt to increase stride length by skipping boxes  Karaoke each side x 10 ft x 3 sets  Walking backwards x 20 feet x 5 reps with CGA throughout   Side stepping x 20 ft x 4 reps each direction to pt's L is exponentially harder for pt than to the R due to hip drop from R shortened glute med  Standing and sitting rest breaks throughout as pt has winded with standing exercises    Modality__________________                Total 40    Blank areas are intentional and mean the treatment did not include these items.     HEP:                     PT Evaluation Code: (Please list factors)  Patient History/Comorbidities: stenosis, cauda equina compression  Examination: LE, gait, ROM, strength  Clinical Presentation: unstable   Clinical Decision Making: moderate    Patient  History/  Comorbidities Examination  (body structures and functions, activity limitations, and/or participation restrictions) Clinical Presentation Clinical Decision Making (Complexity)   No documented Comorbidities or personal factors 1-2 Elements Stable and/or uncomplicated Low   1-2 documented comorbidities or personal factor 3 Elements Evolving clinical presentation with changing characteristics Moderate   3-4 documented comorbidities or personal factors 4 or more Unstable and unpredictable High                  Hermila Delaney  5/25/2017  8:54 AM

## 2021-06-25 NOTE — PROGRESS NOTES
Progress Notes by Hermila Delaney PT at 4/13/2017 10:00 AM     Author: Hermila Delaney PT Service: -- Author Type: Physical Therapist    Filed: 4/14/2017 11:07 AM Encounter Date: 4/13/2017 Status: Signed    : Hermila Delaney PT (Physical Therapist)       [facility]   [facility]-Pelvic Follow up    Patient Name: Oma Gross  Date of evaluation: 4/13/2017  Referral Diagnosis: neurogenic claudication and decompresion laminectomy   Referring provider: Shahana Gomez CNP  Visits: 5/12  Visit Diagnosis:     ICD-10-CM    1. Lumbar stenosis with neurogenic claudication M48.06    2. Lumbar radiculopathy M54.16    3. Decreased ROM of lumbar spine M25.60    4. Weakness of both hips M62.81    5. Weakness of left hip M62.81        Assessment:    Pt presents S/p L2-5 decompressive laminectomies and neurogenic claudication.  L sided hip weakness >R and limited lumbar ROM in all directions.  Posterior knee and lower leg cramping and N/t decreased with manual techniques this day.  Able to verbalize understanding of appropriate exercises to perform with lower leg pain.  Pt notes decreased posterior knee symptoms with manual techniques this day.  Pt would benefit from sklled physical therapy in order to address pt's deficits to decrease pain and radicular symptoms for walking and standing for extended durations without pain.   The POC is dynamic and will be modified on an ongoing basis.  Prognosis to achieve goals is  good   Pt. is appropriate for skilled PT intervention as outlined in the Plan of Care (POC).  Pt. is a good candidate for skilled PT services to improve pain levels and function.    Goals:  Pt. will demonstrate/verbalize independence in self-management of condition in : 6 weeks  Pt. will have improved quality of sleep: waking less times/night;in 6 weeks  Pt. will be able to walk : 30 minutes;on even surfaces;with less pain;in 6 weeks  Patient will stand : 30 minutes;with less pain;for home  chores;in 6 weeks  Patient will transfer: sit/stand;for in/out of chair;with less pain;in 6 weeks  No Data Recorded    Patient's expectations/goals are realistic.    Barriers to Learning or Achieving Goals:  Chronicity of the problem.  Co-morbidities or other medical factors.  See below       Plan / Patient Instructions:      Plan for next visit: NuStep, follow nerve pathyways for MFR, NOT PRONE,      Subjective:   I have been able to walk more recently. When should I stretch vs. Massage my legs when they hurt?     Functional limitations are described as occurring with:    - walking >10 min   - standing >5 min   - in and out of a chair   - bending   - amb over uneven surfaces   - lifting   - dressing   - squatting   - housework       Objective:        Treatment Today     TREATMENT MINUTES COMMENTS   Evaluation     Self-care/ Home management 8 Pt Education: when to massage LE vs stretch LEs when sharp and painful or feeling full. Repetitive motion when full then massage towards heart. Then when sharp stretch slowly.   What else can I be doing?    Manual therapy 20 Supine with hip/knee 90/90 STM to posterior knee and lower leg    Neuromuscular Re-education  Tall kneeling with B shoulder flexion to 90 degree flexion with MC and VC for upright posture; then 5 reps with B shoulder F from 90 to over head positioning.    Therapeutic Activity     Therapeutic Exercises     Gait training     Modality__________________                Total 28    Blank areas are intentional and mean the treatment did not include these items.     HEP:             PT Evaluation Code: (Please list factors)  Patient History/Comorbidities: stenosis, cauda equina compression  Examination: LE, gait, ROM, strength  Clinical Presentation: unstable   Clinical Decision Making: moderate    Patient History/  Comorbidities Examination  (body structures and functions, activity limitations, and/or participation restrictions) Clinical Presentation Clinical  Decision Making (Complexity)   No documented Comorbidities or personal factors 1-2 Elements Stable and/or uncomplicated Low   1-2 documented comorbidities or personal factor 3 Elements Evolving clinical presentation with changing characteristics Moderate   3-4 documented comorbidities or personal factors 4 or more Unstable and unpredictable High                  Hermila Delaney  4/13/2017  8:54 AM

## 2021-06-25 NOTE — PROGRESS NOTES
"Progress Notes by Víctor Mallory PT at 7/20/2017  2:00 PM     Author: Víctor Mallory PT Service: -- Author Type: Physical Therapist    Filed: 7/20/2017  2:43 PM Encounter Date: 7/20/2017 Status: Signed    : Víctor Mallory PT (Physical Therapist)       [facility]   [facility]umbo-Pelvic Follow up    Patient Name: Oma Gross  Date of evaluation: 7/20/2017  Referral Diagnosis: neurogenic claudication and decompresion laminectomy   Referring provider: Shahana Gomez CNP  Visits: 21  Visit Diagnosis:     ICD-10-CM    1. Lumbar stenosis with neurogenic claudication M48.06    2. Weakness of both hips M62.81    3. Lumbar radiculopathy M54.16    4. Decreased ROM of lumbar spine M25.60        Assessment:       Patient reports no pain post treatment.  Some \"soreness\" in sacral multifidi.    Pt presents S/p L2-5 decompressive laminectomies and neurogenic claudication. Pt demonstrates significant difference in L to R hip strength with L demonstrating increased weakness compared to R in tandem stance. Pt would benefit from sklled physical therapy in order to address pt's deficits to decrease pain and radicular symptoms for walking and standing for extended durations without pain.   The POC is dynamic and will be modified on an ongoing basis.  Prognosis to achieve goals is  good   Pt. is appropriate for skilled PT intervention as outlined in the Plan of Care (POC).  Pt. is a good candidate for skilled PT services to improve pain levels and function.    Goals:  Pt. will demonstrate/verbalize independence in self-management of condition in : 6 weeks Goal met  Pt. will have improved quality of sleep: waking less times/night;in 6 weeks Progressing  Pt. will be able to walk : 30 minutes;on even surfaces;with less pain;in 6 weeks Goal met  Patient will stand : 30 minutes;with less pain;for home chores;in 6 weeks Progressing  Patient will transfer: sit/stand;for in/out of chair;with less pain;in 6 " "weeks Goal Met  No Data Recorded       Plan / Patient Instructions:      Plan for next visit: possibly progress to standing hip extension      Subjective:     Patient reports she is \"back sliding\".  Her standing tolerance seems to be decreasing.  She can only stand at the sink doing dishes for 3 minutes before burning pain in both legs start to increase in burning painful ache.    Functional limitations are described as occurring with.:    - walking >10 min   - standing >5 min   - in and out of a chair   - bending   - amb over uneven surfaces   - lifting   - dressing   - squatting   - housework       Objective:      Limited quads and hip flexors bilaterally.      Treatment Today     TREATMENT MINUTES COMMENTS   Evaluation     Self-care/ Home management     Manual therapy 30 MFR layers 1-3 bilateral: quad, hamstring, gastroc, fibularis.right; Glut max    Trial of longitudinal distraction inconclusive effect.   Neuromuscular Re-education     Therapeutic Activity     Therapeutic Exercises     Gait training     Modality__________________                Total 30    Blank areas are intentional and mean the treatment did not include these items.     HEP:                     PT Evaluation Code: (Please list factors)  Patient History/Comorbidities: stenosis, cauda equina compression  Examination: LE, gait, ROM, strength  Clinical Presentation: unstable   Clinical Decision Making: moderate    Patient History/  Comorbidities Examination  (body structures and functions, activity limitations, and/or participation restrictions) Clinical Presentation Clinical Decision Making (Complexity)   No documented Comorbidities or personal factors 1-2 Elements Stable and/or uncomplicated Low   1-2 documented comorbidities or personal factor 3 Elements Evolving clinical presentation with changing characteristics Moderate   3-4 documented comorbidities or personal factors 4 or more Unstable and unpredictable High                  Víctor K " Mohamud  7/20/2017

## 2021-06-25 NOTE — PROGRESS NOTES
Progress Notes by Hermila Delaney PT at 4/10/2017 10:30 AM     Author: Hermila Delaney PT Service: -- Author Type: Physical Therapist    Filed: 4/10/2017  5:35 PM Encounter Date: 4/10/2017 Status: Signed    : Hermila Delaney PT (Physical Therapist)       [facility]   [facility]-Pelvic Follow up    Patient Name: Oma Gross  Date of evaluation: 4/10/2017  Referral Diagnosis: neurogenic claudication and decompresion laminectomy   Referring provider: Shahana Gomez CNP  Visits: 4/12  Visit Diagnosis:     ICD-10-CM    1. Lumbar stenosis with neurogenic claudication M48.06    2. Lumbar radiculopathy M54.16    3. Decreased ROM of lumbar spine M25.60    4. Weakness of both hips M62.81    5. Weakness of left hip M62.81        Assessment:    Pt presents S/p L2-5 decompressive laminectomies and neurogenic claudication.  L sided hip weakness >R and limited lumbar ROM in all directions.  Posterior knee cramping and N/t relieved with manual techniques this day. Pt able to verbalize understanding of postural modifications for decreased pain with sitting in [employer] this day.  Pt notes decreased posterior knee symptoms with manual techniques this day.  Pt would benefit from sklled physical therapy in order to address pt's deficits to decrease pain and radicular symptoms for walking and standing for extended durations without pain.   The POC is dynamic and will be modified on an ongoing basis.  Prognosis to achieve goals is  good   Pt. is appropriate for skilled PT intervention as outlined in the Plan of Care (POC).  Pt. is a good candidate for skilled PT services to improve pain levels and function.    Goals:  Pt. will demonstrate/verbalize independence in self-management of condition in : 6 weeks  Pt. will have improved quality of sleep: waking less times/night;in 6 weeks  Pt. will be able to walk : 30 minutes;on even surfaces;with less pain;in 6 weeks  Patient will stand : 30 minutes;with less  pain;for home chores;in 6 weeks  Patient will transfer: sit/stand;for in/out of chair;with less pain;in 6 weeks  No Data Recorded    Patient's expectations/goals are realistic.    Barriers to Learning or Achieving Goals:  Chronicity of the problem.  Co-morbidities or other medical factors.  See below       Plan / Patient Instructions:      Plan for next visit: NuStep, follow nerve pathyways for MFR, NOT PRONE,      Subjective:   It was rough sitting through [employer] yesterday. But otherwise its been ok. I am cramping in my R lower leg and NT in my L ankle and foot with a stretching sensation at the lateral aspect of my lower leg.      Functional limitations are described as occurring with:    - walking >10 min   - standing >5 min   - in and out of a chair   - bending   - amb over uneven surfaces   - lifting   - dressing   - squatting   - housework       Objective:        Treatment Today     TREATMENT MINUTES COMMENTS   Evaluation     Self-care/ Home management  Pt Education: why posterior knee and low back are connected for pt   Why soreness pt feels with glute sets is a good thing for strengthening and what to look for to indicate over use.    Manual therapy 20 Seated STM to posterior aspect of R knee, medial and lateral into muscle belly.   Seated STM to L medial posterior knee with posterior fibular glides    Neuromuscular Re-education 8 Discussed modifications to posture to sit through mass without pain in low back or posterior knees.   Discussed movements that could be performed when sitting to help with strengthening in low back and decrease LE pain. (Glute sets and seated marching with knee extension)   Therapeutic Activity     Therapeutic Exercises     Gait training     Modality__________________                Total 28    Blank areas are intentional and mean the treatment did not include these items.     HEP:             PT Evaluation Code: (Please list factors)  Patient History/Comorbidities: stenosis, cauda  equina compression  Examination: LE, gait, ROM, strength  Clinical Presentation: unstable   Clinical Decision Making: moderate    Patient History/  Comorbidities Examination  (body structures and functions, activity limitations, and/or participation restrictions) Clinical Presentation Clinical Decision Making (Complexity)   No documented Comorbidities or personal factors 1-2 Elements Stable and/or uncomplicated Low   1-2 documented comorbidities or personal factor 3 Elements Evolving clinical presentation with changing characteristics Moderate   3-4 documented comorbidities or personal factors 4 or more Unstable and unpredictable High                  Hermila Delaney  4/10/2017  8:54 AM

## 2021-06-25 NOTE — PROGRESS NOTES
Progress Notes by Hermila Delaney PT at 5/16/2017 12:30 PM     Author: Hermila Delaney PT Service: -- Author Type: Physical Therapist    Filed: 5/16/2017  1:50 PM Encounter Date: 5/16/2017 Status: Signed    : Hermila Delaney PT (Physical Therapist)       [facility]   [facility] Follow up    Patient Name: Oma Gross  Date of evaluation: 5/16/2017  Referral Diagnosis: neurogenic claudication and decompresion laminectomy   Referring provider: Shahana Gomez CNP  Visits: 12/12  Visit Diagnosis:     ICD-10-CM    1. Lumbar stenosis with neurogenic claudication M48.06    2. Lumbar radiculopathy M54.16    3. Weakness of both hips M62.81    4. Decreased ROM of lumbar spine M25.60    5. Weakness of left hip M62.81        Assessment:    Pt presents S/p L2-5 decompressive laminectomies and neurogenic claudication. Pt is making steady progress in gluteal and abdominal strengthening as noted increased duration in ambulation with least resistive device. PT notes decreased tension and odd sensation on L after manual techniques as glutes can work in full range of motion this day. Pt would benefit from sklled physical therapy in order to address pt's deficits to decrease pain and radicular symptoms for walking and standing for extended durations without pain.   The POC is dynamic and will be modified on an ongoing basis.  Prognosis to achieve goals is  good   Pt. is appropriate for skilled PT intervention as outlined in the Plan of Care (POC).  Pt. is a good candidate for skilled PT services to improve pain levels and function.    Goals:  Pt. will demonstrate/verbalize independence in self-management of condition in : 6 weeks  Pt. will have improved quality of sleep: waking less times/night;in 6 weeks  Pt. will be able to walk : 30 minutes;on even surfaces;with less pain;in 6 weeks  Patient will stand : 30 minutes;with less pain;for home chores;in 6 weeks  Patient will transfer: sit/stand;for  "in/out of chair;with less pain;in 6 weeks  No Data Recorded       Plan / Patient Instructions:      Plan for next visit: possibly progress to standing hip extension      Subjective:   I am still veering to the R when I walk backwards at home. But I can go farther and I can use just the cane for balance and not my walker. It still not comfortable for me to walk standing all of the way up.     Functional limitations are described as occurring with:    - walking >10 min   - standing >5 min   - in and out of a chair   - bending   - amb over uneven surfaces   - lifting   - dressing   - squatting   - housework       Objective:        Treatment Today     TREATMENT MINUTES COMMENTS   Evaluation     Self-care/ Home management     Manual therapy 22 Supine MFR to in PSIS to greater trochanter area for glutes.    Neuromuscular Re-education      Therapeutic Activity     Therapeutic Exercises     Gait training 8 PT discussed cane height with pt as current cane is ~2\" too tall for pt. PT recommended pt to switch cane to R side while cane is too tall for pt. With R sided cane use pt's ambulation is more consistent and confident than with SPC use on L    Modality__________________                Total 30    Blank areas are intentional and mean the treatment did not include these items.     HEP:                 PT Evaluation Code: (Please list factors)  Patient History/Comorbidities: stenosis, cauda equina compression  Examination: LE, gait, ROM, strength  Clinical Presentation: unstable   Clinical Decision Making: moderate    Patient History/  Comorbidities Examination  (body structures and functions, activity limitations, and/or participation restrictions) Clinical Presentation Clinical Decision Making (Complexity)   No documented Comorbidities or personal factors 1-2 Elements Stable and/or uncomplicated Low   1-2 documented comorbidities or personal factor 3 Elements Evolving clinical presentation with changing characteristics " Moderate   3-4 documented comorbidities or personal factors 4 or more Unstable and unpredictable High                  Hermila Delaney  5/16/2017  8:54 AM

## 2021-06-25 NOTE — PROGRESS NOTES
Progress Notes by Hermila Delaney PT at 6/8/2017 11:00 AM     Author: Hermila Delaney PT Service: -- Author Type: Physical Therapist    Filed: 6/8/2017  1:58 PM Encounter Date: 6/8/2017 Status: Signed    : Hermila Delaney PT (Physical Therapist)       [facility]   [facility] Follow up    Patient Name: Oma Gross  Date of evaluation: 6/8/2017  Referral Diagnosis: neurogenic claudication and decompresion laminectomy   Referring provider: Shahana Gomez CNP  Visits: 18  Visit Diagnosis:     ICD-10-CM    1. Lumbar stenosis with neurogenic claudication M48.06    2. Weakness of both hips M62.81    3. Lumbar radiculopathy M54.16    4. Decreased ROM of lumbar spine M25.60    5. Weakness of left hip M62.81        Assessment:    Pt presents S/p L2-5 decompressive laminectomies and neurogenic claudication. Pt demonstrates significant difference in L to R hip strength with L demonstrating increased weakness compared to R in tandem stance. Pt would benefit from sklled physical therapy in order to address pt's deficits to decrease pain and radicular symptoms for walking and standing for extended durations without pain.   The POC is dynamic and will be modified on an ongoing basis.  Prognosis to achieve goals is  good   Pt. is appropriate for skilled PT intervention as outlined in the Plan of Care (POC).  Pt. is a good candidate for skilled PT services to improve pain levels and function.    Goals:  Pt. will demonstrate/verbalize independence in self-management of condition in : 6 weeks Goal met  Pt. will have improved quality of sleep: waking less times/night;in 6 weeks Progressing  Pt. will be able to walk : 30 minutes;on even surfaces;with less pain;in 6 weeks Goal met  Patient will stand : 30 minutes;with less pain;for home chores;in 6 weeks Progressing  Patient will transfer: sit/stand;for in/out of chair;with less pain;in 6 weeks Goal Met  No Data Recorded       Plan / Patient  Instructions:      Plan for next visit: possibly progress to standing hip extension      Subjective:     I am sore but I can tell that if I do shorter reps and more sets that I feel better.     Functional limitations are described as occurring with:    - walking >10 min   - standing >5 min   - in and out of a chair   - bending   - amb over uneven surfaces   - lifting   - dressing   - squatting   - housework       Objective:        Treatment Today     TREATMENT MINUTES COMMENTS   Evaluation     Self-care/ Home management     Manual therapy     Neuromuscular Re-education 20  Standing in tandem on AirEx mat L foot behind x 5 sec max. With R foot behind able to maintain for 30 sec.   Ball side of bosu up standing to maintain balance without UE and AP and lateral motions for hip strengthening and L to R adjustment as pt tends to decrease her WB on LLE.    Therapeutic Activity     Therapeutic Exercises     Gait training 10 Karaoke/braiding L and R x 40 feet each rep x 6 reps each   Standing with R hip IR and side bending to the R for glute med stretch; repeat on L   Standing PPT      Modality__________________                Total 30    Blank areas are intentional and mean the treatment did not include these items.     HEP:                     PT Evaluation Code: (Please list factors)  Patient History/Comorbidities: stenosis, cauda equina compression  Examination: LE, gait, ROM, strength  Clinical Presentation: unstable   Clinical Decision Making: moderate    Patient History/  Comorbidities Examination  (body structures and functions, activity limitations, and/or participation restrictions) Clinical Presentation Clinical Decision Making (Complexity)   No documented Comorbidities or personal factors 1-2 Elements Stable and/or uncomplicated Low   1-2 documented comorbidities or personal factor 3 Elements Evolving clinical presentation with changing characteristics Moderate   3-4 documented comorbidities or personal factors  4 or more Unstable and unpredictable High                  Hermila Delaney  6/8/2017  8:54 AM

## 2021-06-25 NOTE — PROGRESS NOTES
Progress Notes by Hermila Delaney PT at 6/6/2017 11:00 AM     Author: Hermila Delaney PT Service: -- Author Type: Physical Therapist    Filed: 6/6/2017  5:35 PM Encounter Date: 6/6/2017 Status: Signed    : Hermila Delaney PT (Physical Therapist)       [facility]   [facility] Follow up    Patient Name: Oma Gross  Date of evaluation: 6/6/2017  Referral Diagnosis: neurogenic claudication and decompresion laminectomy   Referring provider: Shahana Gomez CNP  Visits: 17  Visit Diagnosis:     ICD-10-CM    1. Lumbar stenosis with neurogenic claudication M48.06    2. Weakness of both hips M62.81    3. Lumbar radiculopathy M54.16    4. Decreased ROM of lumbar spine M25.60    5. Weakness of left hip M62.81        Assessment:    Pt presents S/p L2-5 decompressive laminectomies and neurogenic claudication. L hip strength continues to progress with exercises and emphasizing increasing stride width and length with ambulation activities. Pt would benefit from sklled physical therapy in order to address pt's deficits to decrease pain and radicular symptoms for walking and standing for extended durations without pain.   The POC is dynamic and will be modified on an ongoing basis.  Prognosis to achieve goals is  good   Pt. is appropriate for skilled PT intervention as outlined in the Plan of Care (POC).  Pt. is a good candidate for skilled PT services to improve pain levels and function.    Goals:  Pt. will demonstrate/verbalize independence in self-management of condition in : 6 weeks Goal met  Pt. will have improved quality of sleep: waking less times/night;in 6 weeks Progressing  Pt. will be able to walk : 30 minutes;on even surfaces;with less pain;in 6 weeks Goal met  Patient will stand : 30 minutes;with less pain;for home chores;in 6 weeks Progressing  Patient will transfer: sit/stand;for in/out of chair;with less pain;in 6 weeks Goal Met  No Data Recorded       Plan / Patient Instructions:  "     Plan for next visit: possibly progress to standing hip extension      Subjective:     I fell this weekend. So I have been sore ever since. All things considered I am doing well    Functional limitations are described as occurring with:    - walking >10 min   - standing >5 min   - in and out of a chair   - bending   - amb over uneven surfaces   - lifting   - dressing   - squatting   - housework       Objective:        Treatment Today     TREATMENT MINUTES COMMENTS   Evaluation     Self-care/ Home management     Manual therapy     Neuromuscular Re-education      Therapeutic Activity     Therapeutic Exercises     Gait training 30 Step ups on 6\" step:  - posterior x 10 reps R LE only, x 20 reps alternating  - lateral x 15 reps x 2 sets each side  Side stepping with emphasis on wide stance x 6 laps with CGA x 40 ft per lap  Backwards amb with hand between pt's shoulder blades for MC for balance x 6 reps with VC for upright posture and hip extension  Karaoke/braiding L and L x 40 feet each rep x 6 reps each   Standing with R hip IR and side bending to the R for glute med stretch  Standing PPT   Throughout pt requires manual cues and verbal affirmation of proper positioning as pt tends to brace with great toes increasing strain on groin unless otherwise cued.    Modality__________________                Total 30    Blank areas are intentional and mean the treatment did not include these items.     HEP:                     PT Evaluation Code: (Please list factors)  Patient History/Comorbidities: stenosis, cauda equina compression  Examination: LE, gait, ROM, strength  Clinical Presentation: unstable   Clinical Decision Making: moderate    Patient History/  Comorbidities Examination  (body structures and functions, activity limitations, and/or participation restrictions) Clinical Presentation Clinical Decision Making (Complexity)   No documented Comorbidities or personal factors 1-2 Elements Stable and/or uncomplicated " Low   1-2 documented comorbidities or personal factor 3 Elements Evolving clinical presentation with changing characteristics Moderate   3-4 documented comorbidities or personal factors 4 or more Unstable and unpredictable High                  Hermila Delaney  6/6/2017  8:54 AM

## 2021-06-25 NOTE — PROGRESS NOTES
"Progress Notes by Víctor Mallory PT at 6/29/2017 11:00 AM     Author: Víctor Mallory PT Service: -- Author Type: Physical Therapist    Filed: 6/29/2017  3:12 PM Encounter Date: 6/29/2017 Status: Signed    : Víctor Mallory PT (Physical Therapist)       [facility]   [facility] Follow up    Patient Name: Oma Gross  Date of evaluation: 6/29/2017  Referral Diagnosis: neurogenic claudication and decompresion laminectomy   Referring provider: Shahana Gomez CNP  Visits: 20  Visit Diagnosis:     ICD-10-CM    1. Lumbar stenosis with neurogenic claudication M48.06    2. Weakness of both hips M62.81    3. Lumbar radiculopathy M54.16    4. Decreased ROM of lumbar spine M25.60    5. Weakness of left hip M62.81        Assessment:       Patient reports no pain post treatment.  Some \"soreness\" in sacral multifidi.    Pt presents S/p L2-5 decompressive laminectomies and neurogenic claudication. Pt demonstrates significant difference in L to R hip strength with L demonstrating increased weakness compared to R in tandem stance. Pt would benefit from sklled physical therapy in order to address pt's deficits to decrease pain and radicular symptoms for walking and standing for extended durations without pain.   The POC is dynamic and will be modified on an ongoing basis.  Prognosis to achieve goals is  good   Pt. is appropriate for skilled PT intervention as outlined in the Plan of Care (POC).  Pt. is a good candidate for skilled PT services to improve pain levels and function.    Goals:  Pt. will demonstrate/verbalize independence in self-management of condition in : 6 weeks Goal met  Pt. will have improved quality of sleep: waking less times/night;in 6 weeks Progressing  Pt. will be able to walk : 30 minutes;on even surfaces;with less pain;in 6 weeks Goal met  Patient will stand : 30 minutes;with less pain;for home chores;in 6 weeks Progressing  Patient will transfer: sit/stand;for " in/out of chair;with less pain;in 6 weeks Goal Met  No Data Recorded       Plan / Patient Instructions:      Plan for next visit: possibly progress to standing hip extension      Subjective:     Patient has backed off her HEP.  She notes that the legs have improved since last visit.  However, she has some tenderness to self-palpation on superior iliac crest.    Functional limitations are described as occurring with.:    - walking >10 min   - standing >5 min   - in and out of a chair   - bending   - amb over uneven surfaces   - lifting   - dressing   - squatting   - housework       Objective:      Limited quads and hip flexors bilaterally.      Treatment Today     TREATMENT MINUTES COMMENTS   Evaluation     Self-care/ Home management     Manual therapy 27 MFR layers 1-3 bilateral: quad, hamstring, gastroc, fibularis.   Neuromuscular Re-education     Therapeutic Activity     Therapeutic Exercises     Gait training     Modality__________________                Total 27    Blank areas are intentional and mean the treatment did not include these items.     HEP:                     PT Evaluation Code: (Please list factors)  Patient History/Comorbidities: stenosis, cauda equina compression  Examination: LE, gait, ROM, strength  Clinical Presentation: unstable   Clinical Decision Making: moderate    Patient History/  Comorbidities Examination  (body structures and functions, activity limitations, and/or participation restrictions) Clinical Presentation Clinical Decision Making (Complexity)   No documented Comorbidities or personal factors 1-2 Elements Stable and/or uncomplicated Low   1-2 documented comorbidities or personal factor 3 Elements Evolving clinical presentation with changing characteristics Moderate   3-4 documented comorbidities or personal factors 4 or more Unstable and unpredictable High                  Víctor Mallory  6/29/2017  8:54 AM

## 2021-06-25 NOTE — PROGRESS NOTES
Progress Notes by Hermila Delaney PT at 5/18/2017 12:30 PM     Author: Hermila Delaney PT Service: -- Author Type: Physical Therapist    Filed: 5/18/2017  1:45 PM Encounter Date: 5/18/2017 Status: Signed    : Hermila Delaney PT (Physical Therapist)       [facility]   [facility] Follow up    Patient Name: Oma Gross  Date of evaluation: 5/18/2017  Referral Diagnosis: neurogenic claudication and decompresion laminectomy   Referring provider: Shahana Gomez CNP  Visits: 12/12  Visit Diagnosis:     ICD-10-CM    1. Lumbar stenosis with neurogenic claudication M48.06    2. Lumbar radiculopathy M54.16    3. Weakness of both hips M62.81    4. Decreased ROM of lumbar spine M25.60    5. Weakness of left hip M62.81        Assessment:    Pt presents S/p L2-5 decompressive laminectomies and neurogenic claudication. Pt is making steady progress in gluteal and abdominal strengthening as noted increased duration in ambulation with least resistive device. L low back tension due to relative R LE weakness. Upper back tension decreased with stretching and rhomboid and middle trap activation.   Pt would benefit from sklled physical therapy in order to address pt's deficits to decrease pain and radicular symptoms for walking and standing for extended durations without pain.   The POC is dynamic and will be modified on an ongoing basis.  Prognosis to achieve goals is  good   Pt. is appropriate for skilled PT intervention as outlined in the Plan of Care (POC).  Pt. is a good candidate for skilled PT services to improve pain levels and function.    Goals:  Pt. will demonstrate/verbalize independence in self-management of condition in : 6 weeks  Pt. will have improved quality of sleep: waking less times/night;in 6 weeks  Pt. will be able to walk : 30 minutes;on even surfaces;with less pain;in 6 weeks  Patient will stand : 30 minutes;with less pain;for home chores;in 6 weeks  Patient will transfer:  sit/stand;for in/out of chair;with less pain;in 6 weeks  No Data Recorded       Plan / Patient Instructions:      Plan for next visit: possibly progress to standing hip extension      Subjective:     i was sore after last time the next day but its been good other than that. Can you look at my cane and tell me if its right?     Functional limitations are described as occurring with:    - walking >10 min   - standing >5 min   - in and out of a chair   - bending   - amb over uneven surfaces   - lifting   - dressing   - squatting   - housework       Objective:        Treatment Today     TREATMENT MINUTES COMMENTS   Evaluation     Self-care/ Home management     Manual therapy     Neuromuscular Re-education      Therapeutic Activity     Therapeutic Exercises 30 Side step ups  x 10 reps each side with foam to tap heel onto   Mini squats x 10 reps   Sit to stands with R LE behind x 10 reps   R hip extension WB x 10 sec holds x 5 reps   Seated thoracic extension with ball on table   Seated Rhomboid and middle trap x 5 reps each  Discussed pt's current exercises and how to progress stair training.   Rest break before walking out for the day as hip extension exercise was fatiguing for pt.    Gait training 10 PT discussed cane height as  cut it off too short now. VC to coordinate L LE and cane in R hand with ambulation. X 200 ft    Modality__________________                Total 30    Blank areas are intentional and mean the treatment did not include these items.     HEP:                 PT Evaluation Code: (Please list factors)  Patient History/Comorbidities: stenosis, cauda equina compression  Examination: LE, gait, ROM, strength  Clinical Presentation: unstable   Clinical Decision Making: moderate    Patient History/  Comorbidities Examination  (body structures and functions, activity limitations, and/or participation restrictions) Clinical Presentation Clinical Decision Making (Complexity)   No documented  Comorbidities or personal factors 1-2 Elements Stable and/or uncomplicated Low   1-2 documented comorbidities or personal factor 3 Elements Evolving clinical presentation with changing characteristics Moderate   3-4 documented comorbidities or personal factors 4 or more Unstable and unpredictable High                  Hermila Delaney  5/18/2017  8:54 AM

## 2021-06-25 NOTE — PROGRESS NOTES
Progress Notes by Hermila Delaney PT at 5/4/2017 12:30 PM     Author: Hermila Delaney PT Service: -- Author Type: Physical Therapist    Filed: 5/4/2017  1:47 PM Encounter Date: 5/4/2017 Status: Signed    : Hermila Delaney PT (Physical Therapist)       [facility]   [facility] Follow up    Patient Name: Oma Gross  Date of evaluation: 5/4/2017  Referral Diagnosis: neurogenic claudication and decompresion laminectomy   Referring provider: Shahana Gomez CNP  Visits: 9/12  Visit Diagnosis:     ICD-10-CM    1. Lumbar stenosis with neurogenic claudication M48.06    2. Lumbar radiculopathy M54.16    3. Weakness of both hips M62.81    4. Decreased ROM of lumbar spine M25.60        Assessment:    Pt presents S/p L2-5 decompressive laminectomies and neurogenic claudication. Pt is making steady progress in gluteal and abdominal strengthening as noted in increased duration of walking and walking without a walker. L hip extension continues to be weaker than R hip extension with B pec tension, though pt has a history of chest wall weakness. Pt able to demonstrate abdominal exercises well this day notig even contraction L to R.  Pt would benefit from sklled physical therapy in order to address pt's deficits to decrease pain and radicular symptoms for walking and standing for extended durations without pain.   The POC is dynamic and will be modified on an ongoing basis.  Prognosis to achieve goals is  good   Pt. is appropriate for skilled PT intervention as outlined in the Plan of Care (POC).  Pt. is a good candidate for skilled PT services to improve pain levels and function.    Goals:  Pt. will demonstrate/verbalize independence in self-management of condition in : 6 weeks  Pt. will have improved quality of sleep: waking less times/night;in 6 weeks  Pt. will be able to walk : 30 minutes;on even surfaces;with less pain;in 6 weeks  Patient will stand : 30 minutes;with less pain;for home  chores;in 6 weeks  Patient will transfer: sit/stand;for in/out of chair;with less pain;in 6 weeks  No Data Recorded       Plan / Patient Instructions:      Plan for next visit: NuStep, follow nerve pathyways for MFR, NOT PRONE, Cervical MFR     Subjective:   I am trying to walk more without my walker but i feel like I am leaning forward too much when I get tired. I am very pleased with the progress though i was sore after last time.     Functional limitations are described as occurring with:    - walking >10 min   - standing >5 min   - in and out of a chair   - bending   - amb over uneven surfaces   - lifting   - dressing   - squatting   - housework       Objective:        Treatment Today     TREATMENT MINUTES COMMENTS   Evaluation     Self-care/ Home management     Manual therapy     Neuromuscular Re-education      Therapeutic Activity     Therapeutic Exercises 30 Supine TA bracing with LE in 90/90:  - reaching B to ankles  - X pattern with clasped hands each side   Discussed bridging with LEs elevated at home.   Discussed pt's questions about performing exercises in sitting.    Gait training 10 Amb with SBA without 4WW this day. VC for abdominal activation throughout and to push off of toes B for hip extension.    Modality__________________                Total 40    Blank areas are intentional and mean the treatment did not include these items.     HEP:               PT Evaluation Code: (Please list factors)  Patient History/Comorbidities: stenosis, cauda equina compression  Examination: LE, gait, ROM, strength  Clinical Presentation: unstable   Clinical Decision Making: moderate    Patient History/  Comorbidities Examination  (body structures and functions, activity limitations, and/or participation restrictions) Clinical Presentation Clinical Decision Making (Complexity)   No documented Comorbidities or personal factors 1-2 Elements Stable and/or uncomplicated Low   1-2 documented comorbidities or personal  factor 3 Elements Evolving clinical presentation with changing characteristics Moderate   3-4 documented comorbidities or personal factors 4 or more Unstable and unpredictable High                  Hermila Delaney  5/4/2017  8:54 AM

## 2021-06-25 NOTE — PROGRESS NOTES
Progress Notes by Hermila Delaney PT at 6/1/2017 12:30 PM     Author: Hermila Delaney PT Service: -- Author Type: Physical Therapist    Filed: 6/1/2017  2:00 PM Encounter Date: 6/1/2017 Status: Signed    : Hermila Delaney PT (Physical Therapist)       [facility]   [facility] Follow up    Patient Name: Oma Gross  Date of evaluation: 6/1/2017  Referral Diagnosis: neurogenic claudication and decompresion laminectomy   Referring provider: Shahana Gomez CNP  Visits: 16  Visit Diagnosis:     ICD-10-CM    1. Lumbar stenosis with neurogenic claudication M48.06    2. Weakness of both hips M62.81    3. Lumbar radiculopathy M54.16    4. Decreased ROM of lumbar spine M25.60    5. Weakness of left hip M62.81        Assessment:    Pt presents S/p L2-5 decompressive laminectomies and neurogenic claudication. L low back pain continues to decrease throughout the session with small motions on theraball.  Pt notes fatigue at the end of the session in her LEs and glutes. PT notes balance has significantly improved since last visit as noted with ladder exercises.  Pt would benefit from sklled physical therapy in order to address pt's deficits to decrease pain and radicular symptoms for walking and standing for extended durations without pain.   The POC is dynamic and will be modified on an ongoing basis.  Prognosis to achieve goals is  good   Pt. is appropriate for skilled PT intervention as outlined in the Plan of Care (POC).  Pt. is a good candidate for skilled PT services to improve pain levels and function.    Goals:  Pt. will demonstrate/verbalize independence in self-management of condition in : 6 weeks Goal met  Pt. will have improved quality of sleep: waking less times/night;in 6 weeks Progressing  Pt. will be able to walk : 30 minutes;on even surfaces;with less pain;in 6 weeks Goal met  Patient will stand : 30 minutes;with less pain;for home chores;in 6 weeks Progressing  Patient will  transfer: sit/stand;for in/out of chair;with less pain;in 6 weeks Goal Met  No Data Recorded       Plan / Patient Instructions:      Plan for next visit: possibly progress to standing hip extension      Subjective:     I am really sore from last session. I walked a lot after last time and it got progressively better, to the point where I didn't have pain anymore in the low back.     Functional limitations are described as occurring with:    - walking >10 min   - standing >5 min   - in and out of a chair   - bending   - amb over uneven surfaces   - lifting   - dressing   - squatting   - housework       Objective:        Treatment Today     TREATMENT MINUTES COMMENTS   Evaluation     Self-care/ Home management     Manual therapy     Neuromuscular Re-education 30  Seated on 55 cm ball:  - PPT  - hip hiking   - X pattern with posterior L and anterior R for low back stretching and L abdominal activation  +MC for Anterior L shoulder movement for even abdominal activation  - TA bracing   - TA bracing + posterior pelvic tilt.   VC and MC throughout for neutral knee, hip, and ankle positioning for full glute activation and utilization with TA bracing    Therapeutic Activity     Therapeutic Exercises     Gait training     Modality__________________                Total 30    Blank areas are intentional and mean the treatment did not include these items.     HEP:                     PT Evaluation Code: (Please list factors)  Patient History/Comorbidities: stenosis, cauda equina compression  Examination: LE, gait, ROM, strength  Clinical Presentation: unstable   Clinical Decision Making: moderate    Patient History/  Comorbidities Examination  (body structures and functions, activity limitations, and/or participation restrictions) Clinical Presentation Clinical Decision Making (Complexity)   No documented Comorbidities or personal factors 1-2 Elements Stable and/or uncomplicated Low   1-2 documented comorbidities or personal  factor 3 Elements Evolving clinical presentation with changing characteristics Moderate   3-4 documented comorbidities or personal factors 4 or more Unstable and unpredictable High                  Hermila Delaney  6/1/2017  8:54 AM

## 2021-06-25 NOTE — PROGRESS NOTES
Progress Notes by Hermila Delaney PT at 5/11/2017 12:30 PM     Author: Hermila Delaney PT Service: -- Author Type: Physical Therapist    Filed: 5/11/2017  1:21 PM Encounter Date: 5/11/2017 Status: Signed    : Hermila Delaney PT (Physical Therapist)       [facility]   [facility] Follow up    Patient Name: Oma Gross  Date of evaluation: 5/11/2017  Referral Diagnosis: neurogenic claudication and decompresion laminectomy   Referring provider: Shahana Gomez CNP  Visits: 11/12  Visit Diagnosis:     ICD-10-CM    1. Lumbar stenosis with neurogenic claudication M48.06    2. Lumbar radiculopathy M54.16    3. Weakness of both hips M62.81    4. Decreased ROM of lumbar spine M25.60        Assessment:    Pt presents S/p L2-5 decompressive laminectomies and neurogenic claudication. Pt is making steady progress in gluteal and abdominal strengthening as noted in tall kneeling and standing exercise tolerance.  Pt is able to demonstrate new exercises well due to abdominal strengthening and verbalize ways to decrease pain in low back. Pt would benefit from sklled physical therapy in order to address pt's deficits to decrease pain and radicular symptoms for walking and standing for extended durations without pain.   The POC is dynamic and will be modified on an ongoing basis.  Prognosis to achieve goals is  good   Pt. is appropriate for skilled PT intervention as outlined in the Plan of Care (POC).  Pt. is a good candidate for skilled PT services to improve pain levels and function.    Goals:  Pt. will demonstrate/verbalize independence in self-management of condition in : 6 weeks  Pt. will have improved quality of sleep: waking less times/night;in 6 weeks  Pt. will be able to walk : 30 minutes;on even surfaces;with less pain;in 6 weeks  Patient will stand : 30 minutes;with less pain;for home chores;in 6 weeks  Patient will transfer: sit/stand;for in/out of chair;with less pain;in 6 weeks  No Data  "Recorded       Plan / Patient Instructions:      Plan for next visit: possibly progress to standing hip extension      Subjective:   I was sore after last visit, but I have the L sided pain in my low back that I don't understand.     Functional limitations are described as occurring with:    - walking >10 min   - standing >5 min   - in and out of a chair   - bending   - amb over uneven surfaces   - lifting   - dressing   - squatting   - housework       Objective:        Treatment Today     TREATMENT MINUTES COMMENTS   Evaluation     Self-care/ Home management     Manual therapy     Neuromuscular Re-education      Therapeutic Activity     Therapeutic Exercises 10 Standing hip extension x 10 reps each side   Standing hip Abd x 10 reps  X 2 sets  Seated and standing PPT  Tall kneeling x 4 reps on low table  - pt notes tightness in HS  Discussed positioning for abdominal exercise to decrease L sided pain   Gait training 15 Backwards stairs ascending x 5 reps x 4 \" steps  Lateral step up x 10 reps each side   AMb forward with emphasis on R heavy leg due to L lateral hip shift    Modality__________________                Total 25    Blank areas are intentional and mean the treatment did not include these items.     HEP:                 PT Evaluation Code: (Please list factors)  Patient History/Comorbidities: stenosis, cauda equina compression  Examination: LE, gait, ROM, strength  Clinical Presentation: unstable   Clinical Decision Making: moderate    Patient History/  Comorbidities Examination  (body structures and functions, activity limitations, and/or participation restrictions) Clinical Presentation Clinical Decision Making (Complexity)   No documented Comorbidities or personal factors 1-2 Elements Stable and/or uncomplicated Low   1-2 documented comorbidities or personal factor 3 Elements Evolving clinical presentation with changing characteristics Moderate   3-4 documented comorbidities or personal factors 4 or " more Unstable and unpredictable High                  Hermila Delaney  5/11/2017  8:54 AM